# Patient Record
Sex: MALE | Race: WHITE | Employment: FULL TIME | URBAN - NONMETROPOLITAN AREA
[De-identification: names, ages, dates, MRNs, and addresses within clinical notes are randomized per-mention and may not be internally consistent; named-entity substitution may affect disease eponyms.]

---

## 2022-07-20 ENCOUNTER — HOSPITAL ENCOUNTER (EMERGENCY)
Age: 47
Discharge: HOME | End: 2022-07-21
Attending: EMERGENCY MEDICINE
Payer: COMMERCIAL

## 2022-07-20 ENCOUNTER — APPOINTMENT (OUTPATIENT)
Dept: CT IMAGING | Age: 47
End: 2022-07-20
Attending: EMERGENCY MEDICINE
Payer: COMMERCIAL

## 2022-07-20 DIAGNOSIS — K80.50 BILIARY COLIC: Primary | ICD-10-CM

## 2022-07-20 PROCEDURE — 74176 CT ABD & PELVIS W/O CONTRAST: CPT

## 2022-07-20 PROCEDURE — 84484 ASSAY OF TROPONIN QUANT: CPT

## 2022-07-20 PROCEDURE — 80076 HEPATIC FUNCTION PANEL: CPT

## 2022-07-20 PROCEDURE — 36415 COLL VENOUS BLD VENIPUNCTURE: CPT

## 2022-07-20 PROCEDURE — 80048 BASIC METABOLIC PNL TOTAL CA: CPT

## 2022-07-20 PROCEDURE — 96375 TX/PRO/DX INJ NEW DRUG ADDON: CPT

## 2022-07-20 PROCEDURE — 85025 COMPLETE CBC W/AUTO DIFF WBC: CPT

## 2022-07-20 PROCEDURE — 74011250636 HC RX REV CODE- 250/636: Performed by: EMERGENCY MEDICINE

## 2022-07-20 PROCEDURE — 96374 THER/PROPH/DIAG INJ IV PUSH: CPT

## 2022-07-20 PROCEDURE — 83690 ASSAY OF LIPASE: CPT

## 2022-07-20 PROCEDURE — 81003 URINALYSIS AUTO W/O SCOPE: CPT

## 2022-07-20 PROCEDURE — 99284 EMERGENCY DEPT VISIT MOD MDM: CPT

## 2022-07-20 RX ORDER — SODIUM CHLORIDE 9 MG/ML
1000 INJECTION, SOLUTION INTRAVENOUS ONCE
Status: COMPLETED | OUTPATIENT
Start: 2022-07-20 | End: 2022-07-21

## 2022-07-20 RX ORDER — KETOROLAC TROMETHAMINE 30 MG/ML
30 INJECTION, SOLUTION INTRAMUSCULAR; INTRAVENOUS
Status: COMPLETED | OUTPATIENT
Start: 2022-07-20 | End: 2022-07-20

## 2022-07-20 RX ORDER — ONDANSETRON 2 MG/ML
4 INJECTION INTRAMUSCULAR; INTRAVENOUS
Status: COMPLETED | OUTPATIENT
Start: 2022-07-20 | End: 2022-07-20

## 2022-07-20 RX ADMIN — SODIUM CHLORIDE 1000 ML: 9 INJECTION, SOLUTION INTRAVENOUS at 23:16

## 2022-07-20 RX ADMIN — ONDANSETRON 4 MG: 2 INJECTION INTRAMUSCULAR; INTRAVENOUS at 23:27

## 2022-07-20 RX ADMIN — KETOROLAC TROMETHAMINE 30 MG: 30 INJECTION, SOLUTION INTRAMUSCULAR at 23:17

## 2022-07-21 ENCOUNTER — HOSPITAL ENCOUNTER (EMERGENCY)
Age: 47
Discharge: ANOTHER HOSPITAL | End: 2022-07-22
Attending: EMERGENCY MEDICINE
Payer: COMMERCIAL

## 2022-07-21 VITALS
HEIGHT: 71 IN | TEMPERATURE: 97.6 F | HEART RATE: 84 BPM | DIASTOLIC BLOOD PRESSURE: 100 MMHG | BODY MASS INDEX: 32.06 KG/M2 | RESPIRATION RATE: 18 BRPM | OXYGEN SATURATION: 96 % | SYSTOLIC BLOOD PRESSURE: 152 MMHG | WEIGHT: 229 LBS

## 2022-07-21 DIAGNOSIS — K81.9 CHOLECYSTITIS: Primary | ICD-10-CM

## 2022-07-21 LAB
ALBUMIN SERPL-MCNC: 3.7 G/DL (ref 3.4–5)
ALBUMIN SERPL-MCNC: 3.8 G/DL (ref 3.4–5)
ALBUMIN/GLOB SERPL: 0.8 {RATIO} (ref 0.8–1.7)
ALBUMIN/GLOB SERPL: 1 {RATIO} (ref 0.8–1.7)
ALP SERPL-CCNC: 91 U/L (ref 45–117)
ALP SERPL-CCNC: 99 U/L (ref 45–117)
ALT SERPL-CCNC: 33 U/L (ref 16–61)
ALT SERPL-CCNC: 37 U/L (ref 16–61)
ANION GAP SERPL CALC-SCNC: 6 MMOL/L (ref 3–18)
ANION GAP SERPL CALC-SCNC: 8 MMOL/L (ref 3–18)
APPEARANCE UR: CLEAR
AST SERPL W P-5'-P-CCNC: 20 U/L (ref 10–38)
AST SERPL W P-5'-P-CCNC: 25 U/L (ref 10–38)
BACTERIA URNS QL MICRO: NEGATIVE /HPF
BASOPHILS # BLD: 0.1 K/UL (ref 0–0.1)
BASOPHILS # BLD: 0.1 K/UL (ref 0–0.1)
BASOPHILS NFR BLD: 1 % (ref 0–2)
BASOPHILS NFR BLD: 1 % (ref 0–2)
BILIRUB DIRECT SERPL-MCNC: 0.2 MG/DL (ref 0–0.2)
BILIRUB DIRECT SERPL-MCNC: 0.3 MG/DL (ref 0–0.2)
BILIRUB SERPL-MCNC: 1.3 MG/DL (ref 0.2–1)
BILIRUB SERPL-MCNC: 1.9 MG/DL (ref 0.2–1)
BILIRUB UR QL: NEGATIVE
BUN SERPL-MCNC: 11 MG/DL (ref 7–18)
BUN SERPL-MCNC: 12 MG/DL (ref 7–18)
BUN/CREAT SERPL: 10 (ref 12–20)
BUN/CREAT SERPL: 9 (ref 12–20)
CA-I BLD-MCNC: 9 MG/DL (ref 8.5–10.1)
CA-I BLD-MCNC: 9.3 MG/DL (ref 8.5–10.1)
CHLORIDE SERPL-SCNC: 101 MMOL/L (ref 100–111)
CHLORIDE SERPL-SCNC: 102 MMOL/L (ref 100–111)
CO2 SERPL-SCNC: 28 MMOL/L (ref 21–32)
CO2 SERPL-SCNC: 28 MMOL/L (ref 21–32)
COLOR UR: YELLOW
CREAT SERPL-MCNC: 1.17 MG/DL (ref 0.6–1.3)
CREAT SERPL-MCNC: 1.21 MG/DL (ref 0.6–1.3)
DIFFERENTIAL METHOD BLD: ABNORMAL
DIFFERENTIAL METHOD BLD: NORMAL
EOSINOPHIL # BLD: 0.1 K/UL (ref 0–0.4)
EOSINOPHIL # BLD: 0.2 K/UL (ref 0–0.4)
EOSINOPHIL NFR BLD: 1 % (ref 0–5)
EOSINOPHIL NFR BLD: 2 % (ref 0–5)
EPITH CASTS URNS QL MICRO: ABNORMAL /LPF (ref 0–20)
ERYTHROCYTE [DISTWIDTH] IN BLOOD BY AUTOMATED COUNT: 11.9 % (ref 11.6–14.5)
ERYTHROCYTE [DISTWIDTH] IN BLOOD BY AUTOMATED COUNT: 12 % (ref 11.6–14.5)
GLOBULIN SER CALC-MCNC: 3.8 G/DL (ref 2–4)
GLOBULIN SER CALC-MCNC: 4.5 G/DL (ref 2–4)
GLUCOSE SERPL-MCNC: 105 MG/DL (ref 74–99)
GLUCOSE SERPL-MCNC: 99 MG/DL (ref 74–99)
GLUCOSE UR STRIP.AUTO-MCNC: NEGATIVE MG/DL
HCT VFR BLD AUTO: 42.2 % (ref 36–48)
HCT VFR BLD AUTO: 43 % (ref 36–48)
HGB BLD-MCNC: 14.7 G/DL (ref 13–16)
HGB BLD-MCNC: 14.8 G/DL (ref 13–16)
HGB UR QL STRIP: NEGATIVE
IMM GRANULOCYTES # BLD AUTO: 0 K/UL (ref 0–0.04)
IMM GRANULOCYTES # BLD AUTO: 0.1 K/UL (ref 0–0.04)
IMM GRANULOCYTES NFR BLD AUTO: 0 % (ref 0–0.5)
IMM GRANULOCYTES NFR BLD AUTO: 0 % (ref 0–0.5)
KETONES UR QL STRIP.AUTO: NEGATIVE MG/DL
LEUKOCYTE ESTERASE UR QL STRIP.AUTO: NEGATIVE
LIPASE SERPL-CCNC: 108 U/L (ref 73–393)
LIPASE SERPL-CCNC: 120 U/L (ref 73–393)
LYMPHOCYTES # BLD: 1.9 K/UL (ref 0.9–3.6)
LYMPHOCYTES # BLD: 2.5 K/UL (ref 0.9–3.6)
LYMPHOCYTES NFR BLD: 15 % (ref 21–52)
LYMPHOCYTES NFR BLD: 22 % (ref 21–52)
MCH RBC QN AUTO: 31.3 PG (ref 24–34)
MCH RBC QN AUTO: 31.4 PG (ref 24–34)
MCHC RBC AUTO-ENTMCNC: 34.4 G/DL (ref 31–37)
MCHC RBC AUTO-ENTMCNC: 34.8 G/DL (ref 31–37)
MCV RBC AUTO: 90 FL (ref 78–100)
MCV RBC AUTO: 91.3 FL (ref 78–100)
MONOCYTES # BLD: 1 K/UL (ref 0.05–1.2)
MONOCYTES # BLD: 1.1 K/UL (ref 0.05–1.2)
MONOCYTES NFR BLD: 10 % (ref 3–10)
MONOCYTES NFR BLD: 8 % (ref 3–10)
NEUTS SEG # BLD: 7.5 K/UL (ref 1.8–8)
NEUTS SEG # BLD: 9.3 K/UL (ref 1.8–8)
NEUTS SEG NFR BLD: 65 % (ref 40–73)
NEUTS SEG NFR BLD: 75 % (ref 40–73)
NITRITE UR QL STRIP.AUTO: NEGATIVE
NRBC # BLD: 0 K/UL (ref 0–0.01)
NRBC # BLD: 0 K/UL (ref 0–0.01)
NRBC BLD-RTO: 0 PER 100 WBC
NRBC BLD-RTO: 0 PER 100 WBC
PH UR STRIP: 5.5 [PH] (ref 5–8)
PLATELET # BLD AUTO: 277 K/UL (ref 135–420)
PLATELET # BLD AUTO: 293 K/UL (ref 135–420)
PMV BLD AUTO: 9.1 FL (ref 9.2–11.8)
PMV BLD AUTO: 9.6 FL (ref 9.2–11.8)
POTASSIUM SERPL-SCNC: 3.7 MMOL/L (ref 3.5–5.5)
POTASSIUM SERPL-SCNC: 3.7 MMOL/L (ref 3.5–5.5)
PROT SERPL-MCNC: 7.5 G/DL (ref 6.4–8.2)
PROT SERPL-MCNC: 8.3 G/DL (ref 6.4–8.2)
PROT UR STRIP-MCNC: NEGATIVE MG/DL
RBC # BLD AUTO: 4.69 M/UL (ref 4.35–5.65)
RBC # BLD AUTO: 4.71 M/UL (ref 4.35–5.65)
RBC #/AREA URNS HPF: ABNORMAL /HPF (ref 0–2)
SODIUM SERPL-SCNC: 135 MMOL/L (ref 136–145)
SODIUM SERPL-SCNC: 138 MMOL/L (ref 136–145)
SP GR UR REFRACTOMETRY: 1.02 (ref 1–1.03)
TROPONIN-HIGH SENSITIVITY: 4 NG/L (ref 0–78)
TROPONIN-HIGH SENSITIVITY: 5 NG/L (ref 0–78)
UROBILINOGEN UR QL STRIP.AUTO: 0.2 EU/DL (ref 0.2–1)
WBC # BLD AUTO: 11.4 K/UL (ref 4.6–13.2)
WBC # BLD AUTO: 12.4 K/UL (ref 4.6–13.2)
WBC URNS QL MICRO: ABNORMAL /HPF (ref 0–4)

## 2022-07-21 PROCEDURE — 96374 THER/PROPH/DIAG INJ IV PUSH: CPT

## 2022-07-21 PROCEDURE — 96376 TX/PRO/DX INJ SAME DRUG ADON: CPT

## 2022-07-21 PROCEDURE — 74011250636 HC RX REV CODE- 250/636: Performed by: EMERGENCY MEDICINE

## 2022-07-21 PROCEDURE — 80076 HEPATIC FUNCTION PANEL: CPT

## 2022-07-21 PROCEDURE — 93005 ELECTROCARDIOGRAM TRACING: CPT

## 2022-07-21 PROCEDURE — 74011000258 HC RX REV CODE- 258: Performed by: EMERGENCY MEDICINE

## 2022-07-21 PROCEDURE — 99285 EMERGENCY DEPT VISIT HI MDM: CPT

## 2022-07-21 PROCEDURE — 96375 TX/PRO/DX INJ NEW DRUG ADDON: CPT

## 2022-07-21 PROCEDURE — 83690 ASSAY OF LIPASE: CPT

## 2022-07-21 PROCEDURE — 80048 BASIC METABOLIC PNL TOTAL CA: CPT

## 2022-07-21 PROCEDURE — 74011250637 HC RX REV CODE- 250/637: Performed by: EMERGENCY MEDICINE

## 2022-07-21 RX ORDER — ONDANSETRON 2 MG/ML
4 INJECTION INTRAMUSCULAR; INTRAVENOUS
Status: COMPLETED | OUTPATIENT
Start: 2022-07-21 | End: 2022-07-21

## 2022-07-21 RX ORDER — HYDROMORPHONE HYDROCHLORIDE 1 MG/ML
0.5 INJECTION, SOLUTION INTRAMUSCULAR; INTRAVENOUS; SUBCUTANEOUS ONCE
Status: COMPLETED | OUTPATIENT
Start: 2022-07-21 | End: 2022-07-21

## 2022-07-21 RX ORDER — ONDANSETRON 4 MG/1
4 TABLET, ORALLY DISINTEGRATING ORAL
Qty: 14 TABLET | Refills: 0 | Status: SHIPPED | OUTPATIENT
Start: 2022-07-21

## 2022-07-21 RX ORDER — SODIUM CHLORIDE 9 MG/ML
1000 INJECTION, SOLUTION INTRAVENOUS ONCE
Status: COMPLETED | OUTPATIENT
Start: 2022-07-21 | End: 2022-07-21

## 2022-07-21 RX ORDER — AMOXICILLIN 250 MG/1
500 CAPSULE ORAL
Status: COMPLETED | OUTPATIENT
Start: 2022-07-21 | End: 2022-07-21

## 2022-07-21 RX ORDER — AMOXICILLIN 500 MG/1
500 TABLET, FILM COATED ORAL 3 TIMES DAILY
Qty: 21 TABLET | Refills: 0 | Status: SHIPPED | OUTPATIENT
Start: 2022-07-21 | End: 2022-07-28

## 2022-07-21 RX ORDER — OXYCODONE AND ACETAMINOPHEN 5; 325 MG/1; MG/1
1 TABLET ORAL
Qty: 14 TABLET | Refills: 0 | Status: SHIPPED | OUTPATIENT
Start: 2022-07-21 | End: 2022-07-28

## 2022-07-21 RX ADMIN — HYDROMORPHONE HYDROCHLORIDE 0.5 MG: 1 INJECTION, SOLUTION INTRAMUSCULAR; INTRAVENOUS; SUBCUTANEOUS at 20:57

## 2022-07-21 RX ADMIN — HYDROMORPHONE HYDROCHLORIDE 0.5 MG: 1 INJECTION, SOLUTION INTRAMUSCULAR; INTRAVENOUS; SUBCUTANEOUS at 23:55

## 2022-07-21 RX ADMIN — AMOXICILLIN 500 MG: 250 CAPSULE ORAL at 01:32

## 2022-07-21 RX ADMIN — HYDROMORPHONE HYDROCHLORIDE 0.5 MG: 1 INJECTION, SOLUTION INTRAMUSCULAR; INTRAVENOUS; SUBCUTANEOUS at 22:16

## 2022-07-21 RX ADMIN — ONDANSETRON 4 MG: 2 INJECTION INTRAMUSCULAR; INTRAVENOUS at 23:55

## 2022-07-21 RX ADMIN — CEFTRIAXONE 1 G: 1 INJECTION, POWDER, FOR SOLUTION INTRAMUSCULAR; INTRAVENOUS at 22:16

## 2022-07-21 RX ADMIN — SODIUM CHLORIDE 1000 ML: 9 INJECTION, SOLUTION INTRAVENOUS at 20:57

## 2022-07-21 RX ADMIN — ONDANSETRON 4 MG: 2 INJECTION INTRAMUSCULAR; INTRAVENOUS at 20:57

## 2022-07-21 RX ADMIN — HYDROMORPHONE HYDROCHLORIDE 0.5 MG: 1 INJECTION, SOLUTION INTRAMUSCULAR; INTRAVENOUS; SUBCUTANEOUS at 00:10

## 2022-07-21 NOTE — DISCHARGE INSTRUCTIONS
Return for pain, fever not resolving with motrin or tylenol, shortness of breath, vomiting, decreased fluid intake, weakness, numbness, dizziness, or any change or concerns or if you change your mind regarding admission here now.

## 2022-07-21 NOTE — ED NOTES
Pt states pain much improved s/p medication, resting in POC without distress, respiration regular and unlabored.

## 2022-07-21 NOTE — ED TRIAGE NOTES
Pt ambulatory from [address] [facility] with steady gait, states that aprox 3 hours after drinking coffee \"from a questionable coffee cup\" he began having RUQ pain. Did a colon flush at home with some relief yesterday, took tylenol PM at home aprox 6 hours with no pain relief.  LBM today described as normal.

## 2022-07-21 NOTE — ED PROVIDER NOTES
Pt c/o rt upper abd pain x 2 days, moderate, wax/waning. Sharp. Nothing makes herminio/worse. No def stool changes but tried colon cleanse w no change. Also tyl pm taken w no change. Mild rt mid back pain also. No fever. No cough. no cp. No sob. No prior abd octavia       History reviewed. No pertinent past medical history. History reviewed. No pertinent surgical history. History reviewed. No pertinent family history. Social History     Socioeconomic History    Marital status: UNKNOWN     Spouse name: Not on file    Number of children: Not on file    Years of education: Not on file    Highest education level: Not on file   Occupational History    Not on file   Tobacco Use    Smoking status: Never    Smokeless tobacco: Never   Substance and Sexual Activity    Alcohol use: Never    Drug use: Never    Sexual activity: Not on file   Other Topics Concern    Not on file   Social History Narrative    Not on file     Social Determinants of Health     Financial Resource Strain: Not on file   Food Insecurity: Not on file   Transportation Needs: Not on file   Physical Activity: Not on file   Stress: Not on file   Social Connections: Not on file   Intimate Partner Violence: Not on file   Housing Stability: Not on file         ALLERGIES: Patient has no known allergies. Review of Systems   Constitutional:  Negative for diaphoresis and fever. HENT:  Negative for congestion. Respiratory:  Negative for cough and shortness of breath. Cardiovascular:  Negative for chest pain. Gastrointestinal:  Positive for abdominal pain and nausea. Musculoskeletal:  Positive for back pain. Skin:  Negative for rash. Neurological:  Negative for dizziness. All other systems reviewed and are negative.     Vitals:    07/20/22 2246 07/20/22 2355   BP: (!) 168/104 (!) 154/102   Pulse: 97 78   Resp: 18 18   Temp: 97.6 °F (36.4 °C)    SpO2: 97% 96%   Weight: 103.9 kg (229 lb)    Height: 5' 11\" (1.803 m)             Physical Exam  Vitals and nursing note reviewed. Constitutional:       Appearance: He is well-developed. HENT:      Head: Normocephalic and atraumatic. Eyes:      Conjunctiva/sclera: Conjunctivae normal.   Cardiovascular:      Rate and Rhythm: Normal rate and regular rhythm. Pulmonary:      Effort: Pulmonary effort is normal.      Breath sounds: No wheezing. Abdominal:      Palpations: Abdomen is soft. Tenderness: There is abdominal tenderness in the right upper quadrant. There is no guarding. Musculoskeletal:         General: No tenderness. Cervical back: Normal range of motion. Skin:     General: Skin is warm and dry. Capillary Refill: Capillary refill takes less than 2 seconds. Findings: No rash. Neurological:      Mental Status: He is alert and oriented to person, place, and time.         MDM         Procedures      Vitals:  Patient Vitals for the past 12 hrs:   Temp Pulse Resp BP SpO2   07/20/22 2355 -- 78 18 (!) 154/102 96 %   07/20/22 2246 97.6 °F (36.4 °C) 97 18 (!) 168/104 97 %         Medications ordered:   Medications   amoxicillin (AMOXIL) capsule 500 mg (has no administration in time range)   0.9% sodium chloride infusion 1,000 mL (1,000 mL IntraVENous New Bag 7/20/22 2316)   ondansetron (ZOFRAN) injection 4 mg (4 mg IntraVENous Given 7/20/22 2327)   ketorolac (TORADOL) injection 30 mg (30 mg IntraVENous Given 7/20/22 2317)   HYDROmorphone (DILAUDID) injection 0.5 mg (0.5 mg IntraVENous Given 7/21/22 0010)         Lab findings:  Recent Results (from the past 12 hour(s))   METABOLIC PANEL, BASIC    Collection Time: 07/20/22 11:18 PM   Result Value Ref Range    Sodium 138 136 - 145 mmol/L    Potassium 3.7 3.5 - 5.5 mmol/L    Chloride 102 100 - 111 mmol/L    CO2 28 21 - 32 mmol/L    Anion gap 8 3.0 - 18.0 mmol/L    Glucose 105 (H) 74 - 99 mg/dL    BUN 12 7 - 18 mg/dL    Creatinine 1.17 0.60 - 1.30 mg/dL    BUN/Creatinine ratio 10 (L) 12 - 20      GFR est AA >60 >60 ml/min/1.73m2 GFR est non-AA >60 >60 ml/min/1.73m2    Calcium 9.0 8.5 - 10.1 mg/dL   CBC WITH AUTOMATED DIFF    Collection Time: 07/20/22 11:18 PM   Result Value Ref Range    WBC 11.4 4.6 - 13.2 K/uL    RBC 4.71 4.35 - 5.65 M/uL    HGB 14.8 13.0 - 16.0 g/dL    HCT 43.0 36.0 - 48.0 %    MCV 91.3 78.0 - 100.0 FL    MCH 31.4 24.0 - 34.0 PG    MCHC 34.4 31.0 - 37.0 g/dL    RDW 11.9 11.6 - 14.5 %    PLATELET 147 326 - 043 K/uL    MPV 9.6 9.2 - 11.8 FL    NRBC 0.0 0.0  WBC    ABSOLUTE NRBC 0.00 0.00 - 0.01 K/uL    NEUTROPHILS 65 40 - 73 %    LYMPHOCYTES 22 21 - 52 %    MONOCYTES 10 3 - 10 %    EOSINOPHILS 2 0 - 5 %    BASOPHILS 1 0 - 2 %    IMMATURE GRANULOCYTES 0 0 - 0.5 %    ABS. NEUTROPHILS 7.5 1.8 - 8.0 K/UL    ABS. LYMPHOCYTES 2.5 0.9 - 3.6 K/UL    ABS. MONOCYTES 1.1 0.05 - 1.2 K/UL    ABS. EOSINOPHILS 0.2 0.0 - 0.4 K/UL    ABS. BASOPHILS 0.1 0.0 - 0.1 K/UL    ABS. IMM. GRANS. 0.0 0.00 - 0.04 K/UL    DF AUTOMATED     HEPATIC FUNCTION PANEL    Collection Time: 07/20/22 11:18 PM   Result Value Ref Range    Protein, total 7.5 6.4 - 8.2 g/dL    Albumin 3.7 3.4 - 5.0 g/dL    Globulin 3.8 2.0 - 4.0 g/dL    A-G Ratio 1.0 0.8 - 1.7      Bilirubin, total 1.3 (H) 0.2 - 1.0 mg/dL    Bilirubin, direct 0.2 0.0 - 0.2 mg/dL    Alk.  phosphatase 99 45 - 117 U/L    AST (SGOT) 25 10 - 38 U/L    ALT (SGPT) 37 16 - 61 U/L   LIPASE    Collection Time: 07/20/22 11:18 PM   Result Value Ref Range    Lipase 120 73 - 393 U/L   URINALYSIS W/ RFLX MICROSCOPIC    Collection Time: 07/20/22 11:18 PM   Result Value Ref Range    Color Yellow      Appearance Clear      Specific gravity 1.021 1.005 - 1.030      pH (UA) 5.5 5.0 - 8.0      Protein Negative Negative mg/dL    Glucose Negative Negative mg/dL    Ketone Negative Negative mg/dL    Bilirubin Negative Negative      Blood Negative Negative      Urobilinogen 0.2 0.2 - 1.0 EU/dL    Nitrites Negative Negative      Leukocyte Esterase Negative Negative      WBC 0-4 0 - 4 /hpf    RBC 0-5 0 - 2 /hpf Epithelial cells Few 0 - 20 /lpf    Bacteria Negative (A) None /hpf   TROPONIN-HIGH SENSITIVITY    Collection Time: 07/20/22 11:18 PM   Result Value Ref Range    Troponin-High Sensitivity 4 0 - 78 ng/L           X-Ray, CT or other radiology findings or impressions:  CT ABD PELV WO CONT   Final Result      Gallstones with gallbladder wall thickening/pericholecystic fluid. The   gallbladder disease. No other significant abnormality seen. Progress notes, Consult notes or additional Procedure notes:   1:26 AM pt told of all findings inc s/o infection of gb on ct. Cholecystitis, and sig of that. Pt says doesn't want to be admitted, is pain free, abd soft and non-tender, and adamantly wants dc now. Af, nl wbc, told of sig ct findings but to dc per pt. Detailed ret inst given. Urged to ret for any changes or admit as d/w pt now      Diagnosis:   1. Biliary colic        Disposition: hoome    Follow-up Information       Follow up With Specialties Details Why Contact [facility] EMERGENCY DEPT Emergency Medicine Go to  As needed, If symptoms worsen [facility] [address] [facility]    Madhav Lui MD Surgery Physician, Vascular Surgery, Cardiothoracic Surgery Schedule an appointment as soon as possible for a visit in 3 days  [address] [facility] [address]      Simona Rosas MD Family Medicine Schedule an appointment as soon as possible for a visit in 2 days  [address]  [phone]               Patient's Medications   Start Taking    AMOXICILLIN 500 MG TAB    Take 500 mg by mouth three (3) times daily for 7 days. ONDANSETRON (ZOFRAN ODT) 4 MG DISINTEGRATING TABLET    Take 1 Tablet by mouth every eight (8) hours as needed for Nausea or Vomiting. OXYCODONE-ACETAMINOPHEN (PERCOCET) 5-325 MG PER TABLET    Take 1 Tablet by mouth every six (6) hours as needed for Pain for up to 7 days. Max Daily Amount: 4 Tablets. Continue Taking    No medications on file   These Medications have changed    No medications on file   Stop Taking    No medications on file

## 2022-07-21 NOTE — ED NOTES
I have reviewed discharge instructions with the patient. The patient verbalized understanding. Pt verbalized understanding not to drive while taking oxycodone, wife providing transportation home this morning.

## 2022-07-22 ENCOUNTER — HOSPITAL ENCOUNTER (INPATIENT)
Age: 47
LOS: 3 days | Discharge: HOME | DRG: 414 | End: 2022-07-28
Attending: STUDENT IN AN ORGANIZED HEALTH CARE EDUCATION/TRAINING PROGRAM | Admitting: SURGERY
Payer: COMMERCIAL

## 2022-07-22 VITALS
OXYGEN SATURATION: 97 % | BODY MASS INDEX: 32.06 KG/M2 | WEIGHT: 229 LBS | HEART RATE: 78 BPM | TEMPERATURE: 98.5 F | SYSTOLIC BLOOD PRESSURE: 163 MMHG | DIASTOLIC BLOOD PRESSURE: 93 MMHG | RESPIRATION RATE: 18 BRPM | HEIGHT: 71 IN

## 2022-07-22 DIAGNOSIS — K81.9 CHOLECYSTITIS: Primary | ICD-10-CM

## 2022-07-22 PROBLEM — K81.0 ACUTE CHOLECYSTITIS: Status: ACTIVE | Noted: 2022-07-22

## 2022-07-22 LAB
ATRIAL RATE: 86 BPM
CALCULATED P AXIS, ECG09: 55 DEGREES
CALCULATED R AXIS, ECG10: 60 DEGREES
CALCULATED T AXIS, ECG11: -15 DEGREES
DIAGNOSIS, 93000: NORMAL
P-R INTERVAL, ECG05: 148 MS
Q-T INTERVAL, ECG07: 349 MS
QRS DURATION, ECG06: 84 MS
QTC CALCULATION (BEZET), ECG08: 418 MS
VENTRICULAR RATE, ECG03: 86 BPM

## 2022-07-22 PROCEDURE — 74011000258 HC RX REV CODE- 258: Performed by: SURGERY

## 2022-07-22 PROCEDURE — 96376 TX/PRO/DX INJ SAME DRUG ADON: CPT

## 2022-07-22 PROCEDURE — 99285 EMERGENCY DEPT VISIT HI MDM: CPT

## 2022-07-22 PROCEDURE — 74011250636 HC RX REV CODE- 250/636: Performed by: SURGERY

## 2022-07-22 PROCEDURE — G0378 HOSPITAL OBSERVATION PER HR: HCPCS

## 2022-07-22 PROCEDURE — 74011000250 HC RX REV CODE- 250: Performed by: SURGERY

## 2022-07-22 PROCEDURE — 96375 TX/PRO/DX INJ NEW DRUG ADDON: CPT

## 2022-07-22 PROCEDURE — 74011250636 HC RX REV CODE- 250/636: Performed by: PHYSICIAN ASSISTANT

## 2022-07-22 PROCEDURE — 96374 THER/PROPH/DIAG INJ IV PUSH: CPT

## 2022-07-22 PROCEDURE — 99219 PR INITIAL OBSERVATION CARE/DAY 50 MINUTES: CPT | Performed by: SURGERY

## 2022-07-22 RX ORDER — SODIUM CHLORIDE 0.9 % (FLUSH) 0.9 %
5-40 SYRINGE (ML) INJECTION AS NEEDED
Status: DISCONTINUED | OUTPATIENT
Start: 2022-07-22 | End: 2022-07-28 | Stop reason: HOSPADM

## 2022-07-22 RX ORDER — PROCHLORPERAZINE EDISYLATE 5 MG/ML
10 INJECTION INTRAMUSCULAR; INTRAVENOUS
Status: COMPLETED | OUTPATIENT
Start: 2022-07-22 | End: 2022-07-22

## 2022-07-22 RX ORDER — HYDROMORPHONE HYDROCHLORIDE 1 MG/ML
0.5 INJECTION, SOLUTION INTRAMUSCULAR; INTRAVENOUS; SUBCUTANEOUS ONCE
Status: COMPLETED | OUTPATIENT
Start: 2022-07-22 | End: 2022-07-22

## 2022-07-22 RX ORDER — ONDANSETRON 2 MG/ML
4 INJECTION INTRAMUSCULAR; INTRAVENOUS
Status: DISCONTINUED | OUTPATIENT
Start: 2022-07-22 | End: 2022-07-28 | Stop reason: HOSPADM

## 2022-07-22 RX ORDER — POLYETHYLENE GLYCOL 3350 17 G/17G
17 POWDER, FOR SOLUTION ORAL DAILY PRN
Status: DISCONTINUED | OUTPATIENT
Start: 2022-07-22 | End: 2022-07-28 | Stop reason: HOSPADM

## 2022-07-22 RX ORDER — ENOXAPARIN SODIUM 100 MG/ML
40 INJECTION SUBCUTANEOUS DAILY
Status: DISCONTINUED | OUTPATIENT
Start: 2022-07-22 | End: 2022-07-24

## 2022-07-22 RX ORDER — SODIUM CHLORIDE 9 MG/ML
75 INJECTION, SOLUTION INTRAVENOUS CONTINUOUS
Status: DISCONTINUED | OUTPATIENT
Start: 2022-07-22 | End: 2022-07-25

## 2022-07-22 RX ORDER — ONDANSETRON 4 MG/1
4 TABLET, ORALLY DISINTEGRATING ORAL
Status: DISCONTINUED | OUTPATIENT
Start: 2022-07-22 | End: 2022-07-28 | Stop reason: HOSPADM

## 2022-07-22 RX ORDER — ACETAMINOPHEN 325 MG/1
650 TABLET ORAL
Status: DISCONTINUED | OUTPATIENT
Start: 2022-07-22 | End: 2022-07-28 | Stop reason: HOSPADM

## 2022-07-22 RX ORDER — ACETAMINOPHEN 650 MG/1
650 SUPPOSITORY RECTAL
Status: DISCONTINUED | OUTPATIENT
Start: 2022-07-22 | End: 2022-07-28 | Stop reason: HOSPADM

## 2022-07-22 RX ORDER — ONDANSETRON 2 MG/ML
4 INJECTION INTRAMUSCULAR; INTRAVENOUS
Status: DISCONTINUED | OUTPATIENT
Start: 2022-07-22 | End: 2022-07-22

## 2022-07-22 RX ORDER — SODIUM CHLORIDE 0.9 % (FLUSH) 0.9 %
5-40 SYRINGE (ML) INJECTION EVERY 8 HOURS
Status: DISCONTINUED | OUTPATIENT
Start: 2022-07-22 | End: 2022-07-28 | Stop reason: HOSPADM

## 2022-07-22 RX ORDER — HYDROMORPHONE HYDROCHLORIDE 1 MG/ML
1 INJECTION, SOLUTION INTRAMUSCULAR; INTRAVENOUS; SUBCUTANEOUS
Status: DISPENSED | OUTPATIENT
Start: 2022-07-22 | End: 2022-07-24

## 2022-07-22 RX ADMIN — PROCHLORPERAZINE EDISYLATE 10 MG: 5 INJECTION INTRAMUSCULAR; INTRAVENOUS at 03:27

## 2022-07-22 RX ADMIN — HYDROMORPHONE HYDROCHLORIDE 1 MG: 1 INJECTION, SOLUTION INTRAMUSCULAR; INTRAVENOUS; SUBCUTANEOUS at 07:37

## 2022-07-22 RX ADMIN — HYDROMORPHONE HYDROCHLORIDE 1 MG: 1 INJECTION, SOLUTION INTRAMUSCULAR; INTRAVENOUS; SUBCUTANEOUS at 11:45

## 2022-07-22 RX ADMIN — SODIUM CHLORIDE 75 ML/HR: 9 INJECTION, SOLUTION INTRAVENOUS at 05:21

## 2022-07-22 RX ADMIN — SODIUM CHLORIDE, PRESERVATIVE FREE 10 ML: 5 INJECTION INTRAVENOUS at 07:37

## 2022-07-22 RX ADMIN — SODIUM CHLORIDE 75 ML/HR: 9 INJECTION, SOLUTION INTRAVENOUS at 20:11

## 2022-07-22 RX ADMIN — PIPERACILLIN SODIUM AND TAZOBACTAM SODIUM 4.5 G: 4; .5 INJECTION, POWDER, LYOPHILIZED, FOR SOLUTION INTRAVENOUS at 05:36

## 2022-07-22 RX ADMIN — SODIUM CHLORIDE, PRESERVATIVE FREE 10 ML: 5 INJECTION INTRAVENOUS at 15:31

## 2022-07-22 RX ADMIN — HYDROMORPHONE HYDROCHLORIDE 1 MG: 1 INJECTION, SOLUTION INTRAMUSCULAR; INTRAVENOUS; SUBCUTANEOUS at 17:19

## 2022-07-22 RX ADMIN — PIPERACILLIN AND TAZOBACTAM 3.38 G: 3; .375 INJECTION, POWDER, LYOPHILIZED, FOR SOLUTION INTRAVENOUS at 20:11

## 2022-07-22 RX ADMIN — PIPERACILLIN AND TAZOBACTAM 3.38 G: 3; .375 INJECTION, POWDER, LYOPHILIZED, FOR SOLUTION INTRAVENOUS at 11:44

## 2022-07-22 RX ADMIN — HYDROMORPHONE HYDROCHLORIDE 0.5 MG: 1 INJECTION, SOLUTION INTRAMUSCULAR; INTRAVENOUS; SUBCUTANEOUS at 03:27

## 2022-07-22 NOTE — PROGRESS NOTES
SON)/ [place] Outpatient Observation Notice (Melquiades Ramirez) provided to patient/representative with verbal explanation of the notice. Time allotted for questions regarding the notice. Patient /representative provided a completed copy of the SON/VOON notice. Copy placed on bedside chart.

## 2022-07-22 NOTE — H&P
History and Physical    Chief complaints: Abdominal pain. History of Presenting Illness:  Marco A Buckner is a 52 y.o. very pleasant man presents hospital abdominal pain for 2 days. Pain is localized epigastric area sometimes radiating to the back. Pain is localized sharp no other relieving factor. Patient was nauseous. Denies any fever but does have some chills. Patient has no prior abdominal surgery. CT scan abdomen pelvis shows acute cholecystitis. History reviewed. No pertinent past medical history. No past surgical history on file. History reviewed. No pertinent family history. Social History     Tobacco Use    Smoking status: Never    Smokeless tobacco: Never   Substance Use Topics    Alcohol use: Never       Prior to Admission medications    Medication Sig Start Date End Date Taking? Authorizing Provider   amoxicillin 500 mg tab Take 500 mg by mouth three (3) times daily for 7 days. 7/21/22 7/28/22 Yes Rae Pinto MD   oxyCODONE-acetaminophen (Percocet) 5-325 mg per tablet Take 1 Tablet by mouth every six (6) hours as needed for Pain for up to 7 days. Max Daily Amount: 4 Tablets. 7/21/22 7/28/22 Yes Rae Pinto MD   ondansetron (ZOFRAN ODT) 4 mg disintegrating tablet Take 1 Tablet by mouth every eight (8) hours as needed for Nausea or Vomiting. 7/21/22  Yes Rae Pinto MD     No Known Allergies     Review of Systems:  Pertinent review of systems discussed in HPI, and rest of organ systems personally reviewed and they are negative. Objective:   Vital signs reviewed:      Visit Vitals  BP (!) 150/86   Pulse 83   Temp (!) 96.6 °F (35.9 °C)   Resp 20   Ht 5' 11\" (1.803 m)   Wt 229 lb (103.9 kg)   SpO2 94%   BMI 31.94 kg/m²       Physical Exam:   General appearance:   Patient is awake and alert, not in particular distress. Head and neck atraumatic normocephalic. ENT shows normal oral mucosa, no jaundice no hoarse voice.   Eyes: Pupil equal gaze appropriate. Cardiac system regular rate rhythm. Pulmonary: No audible wheeze. Chest wall: Chest wall excursion normal with respiration cycle, there is no deformity or chest trauma. Abdomen: Tender epigastric area. Neurologic: Nonfocal.  Cranial nerves intact, no new focal findings. Musculoskeletal system: Motor function normal limits, motor function 5 out of 5, range of motion normal in all 4 extremity  Skin: Warm and moist.  Hematologic system: No obvious bruising. Psychosocial: Appropriate and cooperative. Vascular examination: Lower and upper extremities warm to touch, no signs of ischemia or cyanosis. Data Review: Labs are reviewed. Discussed  Recent Results (from the past 24 hour(s))   CBC WITH AUTOMATED DIFF    Collection Time: 07/21/22  8:23 PM   Result Value Ref Range    WBC 12.4 4.6 - 13.2 K/uL    RBC 4.69 4.35 - 5.65 M/uL    HGB 14.7 13.0 - 16.0 g/dL    HCT 42.2 36.0 - 48.0 %    MCV 90.0 78.0 - 100.0 FL    MCH 31.3 24.0 - 34.0 PG    MCHC 34.8 31.0 - 37.0 g/dL    RDW 12.0 11.6 - 14.5 %    PLATELET 637 913 - 389 K/uL    MPV 9.1 (L) 9.2 - 11.8 FL    NRBC 0.0 0.0  WBC    ABSOLUTE NRBC 0.00 0.00 - 0.01 K/uL    NEUTROPHILS 75 (H) 40 - 73 %    LYMPHOCYTES 15 (L) 21 - 52 %    MONOCYTES 8 3 - 10 %    EOSINOPHILS 1 0 - 5 %    BASOPHILS 1 0 - 2 %    IMMATURE GRANULOCYTES 0 0 - 0.5 %    ABS. NEUTROPHILS 9.3 (H) 1.8 - 8.0 K/UL    ABS. LYMPHOCYTES 1.9 0.9 - 3.6 K/UL    ABS. MONOCYTES 1.0 0.05 - 1.2 K/UL    ABS. EOSINOPHILS 0.1 0.0 - 0.4 K/UL    ABS. BASOPHILS 0.1 0.0 - 0.1 K/UL    ABS. IMM.  GRANS. 0.1 (H) 0.00 - 0.04 K/UL    DF AUTOMATED     METABOLIC PANEL, BASIC    Collection Time: 07/21/22  8:23 PM   Result Value Ref Range    Sodium 135 (L) 136 - 145 mmol/L    Potassium 3.7 3.5 - 5.5 mmol/L    Chloride 101 100 - 111 mmol/L    CO2 28 21 - 32 mmol/L    Anion gap 6 3.0 - 18.0 mmol/L    Glucose 99 74 - 99 mg/dL    BUN 11 7 - 18 mg/dL    Creatinine 1.21 0.60 - 1.30 mg/dL    BUN/Creatinine ratio 9 (L) 12 - 20      GFR est AA >60 >60 ml/min/1.73m2    GFR est non-AA >60 >60 ml/min/1.73m2    Calcium 9.3 8.5 - 10.1 mg/dL   LIPASE    Collection Time: 07/21/22  8:23 PM   Result Value Ref Range    Lipase 108 73 - 393 U/L   HEPATIC FUNCTION PANEL    Collection Time: 07/21/22  8:23 PM   Result Value Ref Range    Protein, total 8.3 (H) 6.4 - 8.2 g/dL    Albumin 3.8 3.4 - 5.0 g/dL    Globulin 4.5 (H) 2.0 - 4.0 g/dL    A-G Ratio 0.8 0.8 - 1.7      Bilirubin, total 1.9 (H) 0.2 - 1.0 mg/dL    Bilirubin, direct 0.3 (H) 0.0 - 0.2 mg/dL    Alk. phosphatase 91 45 - 117 U/L    AST (SGOT) 20 10 - 38 U/L    ALT (SGPT) 33 16 - 61 U/L   TROPONIN-HIGH SENSITIVITY    Collection Time: 07/21/22  8:23 PM   Result Value Ref Range    Troponin-High Sensitivity 5 0 - 78 ng/L   EKG, 12 LEAD, INITIAL    Collection Time: 07/21/22  9:07 PM   Result Value Ref Range    Ventricular Rate 86 BPM    Atrial Rate 86 BPM    P-R Interval 148 ms    QRS Duration 84 ms    Q-T Interval 349 ms    QTC Calculation (Bezet) 418 ms    Calculated P Axis 55 degrees    Calculated R Axis 60 degrees    Calculated T Axis -15 degrees    Diagnosis       Sinus rhythm  Probable left atrial enlargement  Borderline T abnormalities, inferior leads               Imagings reviewed: discussed as below. No name on file. Assessment:     Active Problems:    Cholecystitis (7/22/2022)      Acute cholecystitis (7/22/2022)        Plan:     I discussed with patient CT scan findings, discussed current working diagnosis with acute cholecystitis. Patient's liver enzymes normal there is no signs of choledocholithiasis. We discussed definitive surgical care including laparoscopic cholecystectomy. I am out of town today and is signing out this patient to on-call surgeon. I did inform the patient about this as well. Continue n.p.o. status, continue IV Zosyn and adequate pain control.

## 2022-07-22 NOTE — ED PROVIDER NOTES
EMERGENCY DEPARTMENT HISTORY AND PHYSICAL EXAM      Date: 7/22/2022  Patient Name: Abhinav Mejia    History of Presenting Illness     Chief Complaint   Patient presents with    Abdominal Pain    Transfer Of Care       History Provided By: Patient    HPI: Abhinav Mejia, 52 y.o. male with a significant past medical history of HTN who presents as a transfer from [facility] to this ED for surgical consult and admission secondary to cholecystitis. Patient reports 3 to 4-day history of gradually worsening right upper quadrant pain with radiation through to his back. He additionally endorses nausea and vomiting this evening. Patient had CT imaging at outside hospital which demonstrated Gallstones with gallbladder wall thickening/pericholecystic fluid with concern for CBD stone due to increased total bilirubin. Patient presents with no new concerns or complaints. There are no other complaints, changes, or physical findings at this time.     PCP: None    Current Facility-Administered Medications on File Prior to Encounter   Medication Dose Route Frequency Provider Last Rate Last Admin    [COMPLETED] HYDROmorphone (DILAUDID) injection 0.5 mg  0.5 mg IntraVENous ONCE Juan Jose MENJIVAR MD   0.5 mg at 07/21/22 2057    [COMPLETED] ondansetron (ZOFRAN) injection 4 mg  4 mg IntraVENous NOW Karissa Jack MD   4 mg at 07/21/22 2057    [COMPLETED] 0.9% sodium chloride infusion 1,000 mL  1,000 mL IntraVENous ONCE Juan Jose MENJIVAR MD 1,000 mL/hr at 07/21/22 2057 1,000 mL at 07/21/22 2057    [COMPLETED] cefTRIAXone (ROCEPHIN) 1 g in 0.9% sodium chloride (MBP/ADV) 50 mL MBP  1 g IntraVENous NOW Karissa Jack  mL/hr at 07/21/22 2216 1 g at 07/21/22 2216    [COMPLETED] HYDROmorphone (DILAUDID) injection 0.5 mg  0.5 mg IntraVENous ONCE Karissa Jack MD   0.5 mg at 07/21/22 2216    [COMPLETED] HYDROmorphone (DILAUDID) injection 0.5 mg  0.5 mg IntraVENous ONCE Karissa Jack MD   0.5 mg at 07/21/22 7506    [COMPLETED] ondansetron (ZOFRAN) injection 4 mg  4 mg IntraVENous NOW Eddy Amezquita MD   4 mg at 07/21/22 4871     Current Outpatient Medications on File Prior to Encounter   Medication Sig Dispense Refill    amoxicillin 500 mg tab Take 500 mg by mouth three (3) times daily for 7 days. 21 Tablet 0    oxyCODONE-acetaminophen (Percocet) 5-325 mg per tablet Take 1 Tablet by mouth every six (6) hours as needed for Pain for up to 7 days. Max Daily Amount: 4 Tablets. 14 Tablet 0    ondansetron (ZOFRAN ODT) 4 mg disintegrating tablet Take 1 Tablet by mouth every eight (8) hours as needed for Nausea or Vomiting. 14 Tablet 0       Past History     Past Medical History:  History reviewed. No pertinent past medical history. Past Surgical History:  No past surgical history on file. Family History:  History reviewed. No pertinent family history. Social History:  Social History     Tobacco Use    Smoking status: Never    Smokeless tobacco: Never   Substance Use Topics    Alcohol use: Never    Drug use: Never       Allergies:  No Known Allergies    Review of Systems   Review of Systems   Constitutional: Negative. Negative for chills, diaphoresis and fever. HENT: Negative. Negative for congestion, rhinorrhea and sore throat. Eyes: Negative. Negative for pain. Respiratory: Negative. Negative for cough, chest tightness, shortness of breath and wheezing. Cardiovascular: Negative. Negative for chest pain and palpitations. Gastrointestinal:  Positive for abdominal pain, nausea and vomiting. Negative for diarrhea. Genitourinary: Negative. Negative for difficulty urinating, dysuria, frequency and hematuria. Musculoskeletal:  Positive for back pain. Skin: Negative. Negative for rash. Neurological: Negative. Negative for dizziness, weakness, light-headedness, numbness and headaches. Psychiatric/Behavioral: Negative.      All other systems reviewed and are negative. Physical Exam   Physical Exam  Vitals and nursing note reviewed. Constitutional:       General: He is not in acute distress. Appearance: Normal appearance. He is obese. He is not ill-appearing or toxic-appearing. Comments: Appears uncomfortable   HENT:      Head: Normocephalic and atraumatic. Mouth/Throat:      Mouth: Mucous membranes are moist.   Eyes:      Extraocular Movements: Extraocular movements intact. Conjunctiva/sclera: Conjunctivae normal.      Pupils: Pupils are equal, round, and reactive to light. Cardiovascular:      Rate and Rhythm: Normal rate and regular rhythm. Pulses: Normal pulses. Heart sounds: Normal heart sounds. No murmur heard. No friction rub. No gallop. Pulmonary:      Effort: Pulmonary effort is normal. No respiratory distress. Breath sounds: Normal breath sounds. No wheezing, rhonchi or rales. Abdominal:      General: Bowel sounds are normal. There is no distension. Palpations: Abdomen is soft. Tenderness: There is abdominal tenderness in the right upper quadrant. There is no guarding or rebound. Musculoskeletal:         General: No tenderness. Cervical back: Neck supple. No tenderness. Right lower leg: No edema. Left lower leg: No edema. Skin:     General: Skin is warm and dry. Findings: No rash. Neurological:      General: No focal deficit present. Mental Status: He is alert and oriented to person, place, and time.        Lab and Diagnostic Study Results   Labs -     Recent Results (from the past 12 hour(s))   CBC WITH AUTOMATED DIFF    Collection Time: 07/21/22  8:23 PM   Result Value Ref Range    WBC 12.4 4.6 - 13.2 K/uL    RBC 4.69 4.35 - 5.65 M/uL    HGB 14.7 13.0 - 16.0 g/dL    HCT 42.2 36.0 - 48.0 %    MCV 90.0 78.0 - 100.0 FL    MCH 31.3 24.0 - 34.0 PG    MCHC 34.8 31.0 - 37.0 g/dL    RDW 12.0 11.6 - 14.5 %    PLATELET 418 079 - 117 K/uL    MPV 9.1 (L) 9.2 - 11.8 FL    NRBC 0.0 0.0  WBC    ABSOLUTE NRBC 0.00 0.00 - 0.01 K/uL    NEUTROPHILS 75 (H) 40 - 73 %    LYMPHOCYTES 15 (L) 21 - 52 %    MONOCYTES 8 3 - 10 %    EOSINOPHILS 1 0 - 5 %    BASOPHILS 1 0 - 2 %    IMMATURE GRANULOCYTES 0 0 - 0.5 %    ABS. NEUTROPHILS 9.3 (H) 1.8 - 8.0 K/UL    ABS. LYMPHOCYTES 1.9 0.9 - 3.6 K/UL    ABS. MONOCYTES 1.0 0.05 - 1.2 K/UL    ABS. EOSINOPHILS 0.1 0.0 - 0.4 K/UL    ABS. BASOPHILS 0.1 0.0 - 0.1 K/UL    ABS. IMM. GRANS. 0.1 (H) 0.00 - 0.04 K/UL    DF AUTOMATED     METABOLIC PANEL, BASIC    Collection Time: 07/21/22  8:23 PM   Result Value Ref Range    Sodium 135 (L) 136 - 145 mmol/L    Potassium 3.7 3.5 - 5.5 mmol/L    Chloride 101 100 - 111 mmol/L    CO2 28 21 - 32 mmol/L    Anion gap 6 3.0 - 18.0 mmol/L    Glucose 99 74 - 99 mg/dL    BUN 11 7 - 18 mg/dL    Creatinine 1.21 0.60 - 1.30 mg/dL    BUN/Creatinine ratio 9 (L) 12 - 20      GFR est AA >60 >60 ml/min/1.73m2    GFR est non-AA >60 >60 ml/min/1.73m2    Calcium 9.3 8.5 - 10.1 mg/dL   LIPASE    Collection Time: 07/21/22  8:23 PM   Result Value Ref Range    Lipase 108 73 - 393 U/L   HEPATIC FUNCTION PANEL    Collection Time: 07/21/22  8:23 PM   Result Value Ref Range    Protein, total 8.3 (H) 6.4 - 8.2 g/dL    Albumin 3.8 3.4 - 5.0 g/dL    Globulin 4.5 (H) 2.0 - 4.0 g/dL    A-G Ratio 0.8 0.8 - 1.7      Bilirubin, total 1.9 (H) 0.2 - 1.0 mg/dL    Bilirubin, direct 0.3 (H) 0.0 - 0.2 mg/dL    Alk. phosphatase 91 45 - 117 U/L    AST (SGOT) 20 10 - 38 U/L    ALT (SGPT) 33 16 - 61 U/L   TROPONIN-HIGH SENSITIVITY    Collection Time: 07/21/22  8:23 PM   Result Value Ref Range    Troponin-High Sensitivity 5 0 - 78 ng/L       Radiologic Studies -   @lastxrresult@  CT Results  (Last 48 hours)                 07/20/22 2331  CT ABD PELV WO CONT Final result    Impression:      Gallstones with gallbladder wall thickening/pericholecystic fluid. The   gallbladder disease. No other significant abnormality seen.                Narrative:  EXAM: CT of the abdomen and pelvis       INDICATION: Pain. COMPARISON: None. TECHNIQUE: Axial CT imaging of the abdomen and pelvis was performed without   intravenous contrast. Multiplanar reformats were generated. Dose reduction   techniques used: automated exposure control, adjustment of the mAs and/or kVp   according to patient size, and iterative reconstruction techniques. Digital   imaging and communications in Medicine (DICOM) format image data are available   to nonaffiliated external healthcare facilities or entities on a secure, media   free, reciprocally searchable basis with patient authorization for at least 12   months after this study. _______________       FINDINGS:       LOWER CHEST: Unremarkable. LIVER, BILIARY: Liver is normal. No biliary dilation. Gallstones are noted. There is gallbladder wall thickening/pericholecystic fluid. PANCREAS: Normal.       SPLEEN: Normal.       ADRENALS: Normal.       KIDNEYS: Normal.       LYMPH NODES: No enlarged lymph nodes. GASTROINTESTINAL TRACT: No bowel dilation or wall thickening. The appendix is   visualized and is within normal limits. PELVIC ORGANS: Unremarkable. VASCULATURE: Unremarkable. BONES: No acute or aggressive osseous abnormalities identified. OTHER: None.       _______________                 CXR Results  (Last 48 hours)      None            Medical Decision Making and ED Course   - I am the first provider for this patient. I reviewed the vital signs, available nursing notes, past medical history, past surgical history, family history and social history. - Initial assessment performed. The patients presenting problems have been discussed, and they are in agreement with the care plan formulated and outlined with them. I have encouraged them to ask questions as they arise throughout their visit. Vital Signs-Reviewed the patient's vital signs.   Patient Vitals for the past 12 hrs:   Temp Pulse Resp BP SpO2   07/22/22 0248 -- -- -- -- 96 %   07/22/22 0238 98.3 °F (36.8 °C) 82 18 (!) 155/103 96 %       Differential Diagnosis & Medical Decision Making Provider Note:     MDM  Number of Diagnoses or Management Options  Cholecystitis  Diagnosis management comments: 51 y/o male transferred to this facility for surgical admission d/t cholecystitis. He presents non-toxic appearing, in no acute distress, and with stable vital signs. Will consult surgery for admission. Amount and/or Complexity of Data Reviewed  Review and summarize past medical records: yes  Discuss the patient with other providers: yes (Dr. Funmilayo Bacon)         ED Course:   Consult Note:  3:18 AM  Aleksander Stock PA-C spoke with Dr. Funmilayo Bacon,  general surgeon  Discussed pt's hx, disposition, and available diagnostic and imaging results. Reviewed care plans. Consultant agrees with plans as outlined. Dr. Funmilayo Bacon will admit the patient. Procedures   Performed by: Aleksander Stock PA-C  Procedures      Disposition   Disposition: Admitted to Operating Room the case was discussed with the admitting physician Dr. Funmilayo Bacon  Admitted      Diagnosis/Clinical Impression     Clinical Impression:   1. Cholecystitis        Attestations: Chely Corea PA-C, am the primary clinician of record. Please note that this dictation was completed with [employer], the computer voice recognition software. Quite often unanticipated grammatical, syntax, homophones, and other interpretive errors are inadvertently transcribed by the computer software. Please disregard these errors. Please excuse any errors that have escaped final proofreading. Thank you.

## 2022-07-22 NOTE — PROGRESS NOTES
Reason for Admission:  Acute cholecystitis                     RUR Score:  7%                   Plan for utilizing home health:     Not at this time     PCP: First and Last name:  None     Name of Practice:    Are you a current patient: Yes/No:    Approximate date of last visit:    Can you participate in a virtual visit with your PCP:                     Current Advanced Directive/Advance Care Plan: Full Code      Healthcare Decision Maker:   Click here to complete [address] including selection of the Healthcare Decision Maker Relationship (ie \"Primary\")           Ladan Farley, wife, [phone]                  Transition of Care Plan:       Met f/f with Pt, he confirmed that the information on the face sheet is correct. Pt stated that he lives with his wife and son. Pt stated that he is a  for [employer]. Pt stated no HH, no DME and independent with ADL. Pt stated that he uses [address] on [address] [facility] [address] Amaro Knows stated that his wife will give him a ride home when he is D/C. CM dispo: Home with no needs at this time.

## 2022-07-22 NOTE — ED NOTES
TRANSFER - OUT REPORT:    Verbal report given to Jeanna on Rafael Fuller  being transferred to [phone] for routine progression of care       Report consisted of patients Situation, Background, Assessment and   Recommendations(SBAR). Information from the following report(s) SBAR and ED Summary was reviewed with the receiving nurse. Lines:   Peripheral IV 07/21/22 Right Antecubital (Active)   Site Assessment Clean, dry, & intact 07/21/22 2042   Phlebitis Assessment 0 07/21/22 2042   Infiltration Assessment 0 07/21/22 2042   Dressing Status Clean, dry, & intact 07/21/22 2042   Dressing Type Transparent 07/21/22 2042   Hub Color/Line Status Patent; Flushed 07/21/22 2042   Action Taken Blood drawn 07/21/22 2042        Opportunity for questions and clarification was provided.       Patient transported with:   [employer]

## 2022-07-22 NOTE — PROGRESS NOTES
Neuro/ mobility:  AAOX4. Ambulatory patient    Nursing report:  Reports abdominal pain intermittently. Denies nausea. NPO - surgery scheduled for AM      Nursing Plan of Care:  Pain management.

## 2022-07-22 NOTE — Clinical Note
Status[de-identified] INPATIENT [101]   Type of Bed: Surgical [18]   Cardiac Monitoring Required?: No   Inpatient Hospitalization Certified Necessary for the Following Reasons: 2.  Patient admitted for Inpatient Only Procedure (Surgical)   Admitting Diagnosis: Cholecystitis [440200]   Admitting Physician: Karen Nava [[id number]]   Attending Physician: Karen Nava [[id number]]   Estimated Length of Stay: 3-4 Midnights   Discharge Plan[de-identified] Home with Office Follow-up

## 2022-07-22 NOTE — ED TRIAGE NOTES
Pt seen last night for gall stones. Pt refused admission. PT states he went home and his pain has gotten worse and is now back for admission and treatment.

## 2022-07-22 NOTE — CONSULTS
[facility] SURGERY H&P      I have been requested by Dr. Samson Mendez to manage this patient since he is leaving out of town. Chief Complaint: Abdominal pain   x  3 days    History of Present Illness:    Mr. Natalya Morton is a 52y.o. year old * male who presented initially for right upper abdominal pain. Patient states his pain started suddenly 2 days ago but admits to a poor diet and drinking large amounts of caffeine earlier in the morning. He has had similar pain for months in his right upper back but did not seek medical attention for it. Patient was initially evaluated at [facility] ED for his symptoms and reports CT at that time demonstrated gallstones; however he felt his pain had improved so he was discharged home. Once home patient's pain returned so he presented again to [facility] ED and then was transferred to [facility]. CT abdomen/pelvis was consistent with acute cholecystitis with gallstones and gallbladder wall thickening/pericholecystic fluid. Patient was subsequently started on IVF and Zosyn. He states his pain is currently improved after pain medications. Denies nausea or vomiting. Felt feverish initially when symptoms began but did not take his temperature and has not been febrile since his admission. Past Medical History: History reviewed. No pertinent past medical history. Past Surgical History: No past surgical history on file. Allergy:No Known Allergies    Social History:  reports that he has never smoked. He has never used smokeless tobacco. He reports that he does not drink alcohol and does not use drugs. Family History:History reviewed. No pertinent family history.      Current Medications:  Current Facility-Administered Medications:     sodium chloride (NS) flush 5-40 mL, 5-40 mL, IntraVENous, Q8H, Matt Sutherland MD, 10 mL at 07/22/22 1531    sodium chloride (NS) flush 5-40 mL, 5-40 mL, IntraVENous, PRN, Matt Sutherland MD    acetaminophen (TYLENOL) tablet 650 mg, 650 mg, Oral, Q6H PRN **OR** acetaminophen (TYLENOL) suppository 650 mg, 650 mg, Rectal, Q6H PRN, Ras Sutherland MD    polyethylene glycol (MIRALAX) packet 17 g, 17 g, Oral, DAILY PRN, Ras Sutherland MD    ondansetron (ZOFRAN ODT) tablet 4 mg, 4 mg, Oral, Q8H PRN **OR** ondansetron (ZOFRAN) injection 4 mg, 4 mg, IntraVENous, Q6H PRN, Ras Sutherland MD    enoxaparin (LOVENOX) injection 40 mg, 40 mg, SubCUTAneous, DAILY, Ras Sutherland MD    0.9% sodium chloride infusion, 75 mL/hr, IntraVENous, CONTINUOUS, Ras Sutherland MD, Last Rate: 75 mL/hr at 07/22/22 0521, 75 mL/hr at 07/22/22 0521    HYDROmorphone (DILAUDID) injection 1 mg, 1 mg, IntraVENous, Q4H PRN, Ras Sutherland MD, 1 mg at 07/22/22 1145    piperacillin-tazobactam (ZOSYN) 3.375 g in 0.9% sodium chloride (MBP/ADV) 100 mL MBP, 3.375 g, IntraVENous, Q8H, Ras Sutherland MD, Last Rate: 25 mL/hr at 07/22/22 1144, 3.375 g at 07/22/22 1144    *Please  patient's home medications from pharmacy at discharge*, 1 Each, Other, PRIOR TO DISCHARGE, Ras Sutherland MD     Immunization History: There is no immunization history on file for this patient. Complete    Review of Systems:     Constitutional:  no fever,  no chills,  no sweats, No weakness, No fatigue, No decreased activity. Eye: No recent visual problem, No icterus, No discharge, No double vision. Ear/Nose/Mouth/Throat: No decreased hearing, No ear pain, No nasal congestion, No sore throat. Respiratory: No shortness of breath, No cough, No sputum production, No hemoptysis, No wheezing, No cyanosis. Cardiovascular: No chest pain, No palpitations, No bradycardia, No tachycardia, No peripheral edema, No syncope. Gastrointestinal: No nausea,  No vomiting, No diarrhea, No constipation, No heartburn,  Abdominal pain. Genitourinary: No dysuria, No hematuria, No change in urine stream, No urethral discharge, No lesions.   Hematology/Lymphatics: No bruising tendency, No bleeding tendency, No petechiae, No swollen lymph glands. Endocrine: No excessive thirst, No polyuria, No cold intolerance, No heat intolerance, No excessive hunger. Immunologic: Not immunocompromised, No recurrent fevers, No recurrent infections. Musculoskeletal: No back pain, No neck pain, No joint pain, No muscle pain, No claudication, No decreased range of motion, No trauma. Integumentary: No rash, No pruritus, No abrasions. Neurologic: Alert and oriented X4, No abnormal balance, No headache, No confusion, No numbness, No tingling. Psychiatric: No anxiety, No depression, No mookie. Physical Exam:     Vitals & Measurements: Wt Readings from Last 3 Encounters:   07/22/22 103.9 kg (229 lb)   07/21/22 103.9 kg (229 lb)   07/20/22 103.9 kg (229 lb)     Temp Readings from Last 3 Encounters:   07/22/22 98.3 °F (36.8 °C)   07/21/22 98.5 °F (36.9 °C)   07/20/22 97.6 °F (36.4 °C)     BP Readings from Last 3 Encounters:   07/22/22 120/70   07/22/22 (!) 163/93   07/21/22 (!) 152/100     Pulse Readings from Last 3 Encounters:   07/22/22 (!) 102   07/22/22 78   07/21/22 84      Ht Readings from Last 3 Encounters:   07/22/22 5' 11\" (1.803 m)   07/21/22 5' 11\" (1.803 m)   07/20/22 5' 11\" (1.803 m)          General: well appearing, no acute distress  Head: Normal  Face: Nornal  HEENT: atraumatic, PERRLA, moist mucosa, normal pharynx, normal tonsils and adenoids, normal tongue, no fluid in sinuses  Neck: Trachea midline, no carotid bruit, no masses  Chest: Normal.  Respiratory: Normal chest wall expansion, CTA B, no r/r/w, no rubs  Cardiovascular: RRR, no m/r/g, Normal S1 and S2  Abdomen: Soft, non tender, non-distended, normal bowel sounds in all quadrants, no hepatosplenomegaly, no tympany. Incision scar: none  Genitourinary: No inguinal hernia, normal external gentalia, Testis & scrotum normal, no renal angle tenderness  Rectal: deferred  Musculoskeletal: normal ROM in upper and lower extremities, No joint swelling.   Integumentary: Warm, dry, and pink, with no rash, purpura, or petechia  Heme/Lymph: No lymphadenopathy, no bruises  Neurological:Cranial Nerves II-XII grossly intact, no gross motor or sensory deficit  Psychiatric: Cooperative with normal mood, affect, and cognition      Laboratory Values:   Recent Results (from the past 24 hour(s))   CBC WITH AUTOMATED DIFF    Collection Time: 07/21/22  8:23 PM   Result Value Ref Range    WBC 12.4 4.6 - 13.2 K/uL    RBC 4.69 4.35 - 5.65 M/uL    HGB 14.7 13.0 - 16.0 g/dL    HCT 42.2 36.0 - 48.0 %    MCV 90.0 78.0 - 100.0 FL    MCH 31.3 24.0 - 34.0 PG    MCHC 34.8 31.0 - 37.0 g/dL    RDW 12.0 11.6 - 14.5 %    PLATELET 553 727 - 243 K/uL    MPV 9.1 (L) 9.2 - 11.8 FL    NRBC 0.0 0.0  WBC    ABSOLUTE NRBC 0.00 0.00 - 0.01 K/uL    NEUTROPHILS 75 (H) 40 - 73 %    LYMPHOCYTES 15 (L) 21 - 52 %    MONOCYTES 8 3 - 10 %    EOSINOPHILS 1 0 - 5 %    BASOPHILS 1 0 - 2 %    IMMATURE GRANULOCYTES 0 0 - 0.5 %    ABS. NEUTROPHILS 9.3 (H) 1.8 - 8.0 K/UL    ABS. LYMPHOCYTES 1.9 0.9 - 3.6 K/UL    ABS. MONOCYTES 1.0 0.05 - 1.2 K/UL    ABS. EOSINOPHILS 0.1 0.0 - 0.4 K/UL    ABS. BASOPHILS 0.1 0.0 - 0.1 K/UL    ABS. IMM.  GRANS. 0.1 (H) 0.00 - 0.04 K/UL    DF AUTOMATED     METABOLIC PANEL, BASIC    Collection Time: 07/21/22  8:23 PM   Result Value Ref Range    Sodium 135 (L) 136 - 145 mmol/L    Potassium 3.7 3.5 - 5.5 mmol/L    Chloride 101 100 - 111 mmol/L    CO2 28 21 - 32 mmol/L    Anion gap 6 3.0 - 18.0 mmol/L    Glucose 99 74 - 99 mg/dL    BUN 11 7 - 18 mg/dL    Creatinine 1.21 0.60 - 1.30 mg/dL    BUN/Creatinine ratio 9 (L) 12 - 20      GFR est AA >60 >60 ml/min/1.73m2    GFR est non-AA >60 >60 ml/min/1.73m2    Calcium 9.3 8.5 - 10.1 mg/dL   LIPASE    Collection Time: 07/21/22  8:23 PM   Result Value Ref Range    Lipase 108 73 - 393 U/L   HEPATIC FUNCTION PANEL    Collection Time: 07/21/22  8:23 PM   Result Value Ref Range    Protein, total 8.3 (H) 6.4 - 8.2 g/dL    Albumin 3.8 3.4 - 5.0 g/dL    Globulin 4.5 (H) 2.0 - 4.0 g/dL    A-G Ratio 0.8 0.8 - 1.7      Bilirubin, total 1.9 (H) 0.2 - 1.0 mg/dL    Bilirubin, direct 0.3 (H) 0.0 - 0.2 mg/dL    Alk. phosphatase 91 45 - 117 U/L    AST (SGOT) 20 10 - 38 U/L    ALT (SGPT) 33 16 - 61 U/L   TROPONIN-HIGH SENSITIVITY    Collection Time: 07/21/22  8:23 PM   Result Value Ref Range    Troponin-High Sensitivity 5 0 - 78 ng/L   EKG, 12 LEAD, INITIAL    Collection Time: 07/21/22  9:07 PM   Result Value Ref Range    Ventricular Rate 86 BPM    Atrial Rate 86 BPM    P-R Interval 148 ms    QRS Duration 84 ms    Q-T Interval 349 ms    QTC Calculation (Bezet) 418 ms    Calculated P Axis 55 degrees    Calculated R Axis 60 degrees    Calculated T Axis -15 degrees    Diagnosis       Sinus rhythm  Probable left atrial enlargement  Borderline T abnormalities, inferior leads    Confirmed by DONNA Garcia ([id number]) on 7/22/2022 1:28:06 PM           No orders to display       Assessment:  Problem List Items Addressed This Visit          Digestive    Cholecystitis - Primary        Plan:    Admission  Diet: NPO  IV fluids  SCD  IS  Pain medications  Antibiotics: Zosyn  Nausea medication  Labs in am  OR in the AM  Procedure/Surgery: Lap cholecystectomy, possible open   12. Risk, benefits and complications including bleeding, infection, dvt, pe, mi, stroke, sepsis, injury to bowel, bile duct, bile leak,  dehiscence, open, discussed, questions answered, patient & family clearly understands and agrees with plan. All their questions were answered to their satisfaction. RN was present during entire conversation. Thank you for the consultation & allowing me to participate in the care of this patient.

## 2022-07-22 NOTE — ED PROVIDER NOTES
Pt c/o rt upper abd pain, x 3 days, worsening. No fever. Mild nausea, no vomiting. Pain worse w eating. Rad to rt mid back. No urinary changes. No leg pain. Seen last nt dx w gallstones, wanted to go home, but taking percocet today without much relief. Pain still 7/10. No injury. No diarrhea. No past medical history on file. No past surgical history on file. No family history on file. Social History     Socioeconomic History    Marital status:      Spouse name: Not on file    Number of children: Not on file    Years of education: Not on file    Highest education level: Not on file   Occupational History    Not on file   Tobacco Use    Smoking status: Never    Smokeless tobacco: Never   Substance and Sexual Activity    Alcohol use: Never    Drug use: Never    Sexual activity: Not on file   Other Topics Concern    Not on file   Social History Narrative    Not on file     Social Determinants of Health     Financial Resource Strain: Not on file   Food Insecurity: Not on file   Transportation Needs: Not on file   Physical Activity: Not on file   Stress: Not on file   Social Connections: Not on file   Intimate Partner Violence: Not on file   Housing Stability: Not on file         ALLERGIES: Patient has no known allergies. Review of Systems   Constitutional:  Negative for diaphoresis and fever. HENT:  Negative for congestion. Respiratory:  Negative for cough and shortness of breath. Cardiovascular:  Negative for palpitations. Gastrointestinal:  Positive for abdominal pain and nausea. Genitourinary:  Negative for dysuria. Musculoskeletal:  Positive for back pain. Skin:  Negative for rash. Neurological:  Negative for dizziness. All other systems reviewed and are negative.     Vitals:    07/21/22 2018   BP: (!) 150/97   Pulse: 99   Resp: 18   Temp: 98.5 °F (36.9 °C)   SpO2: 99%   Weight: 103.9 kg (229 lb)   Height: 5' 11\" (1.803 m)            Physical Exam  Vitals and nursing note reviewed. Constitutional:       Appearance: He is well-developed. HENT:      Head: Normocephalic and atraumatic. Eyes:      Conjunctiva/sclera: Conjunctivae normal.   Cardiovascular:      Rate and Rhythm: Normal rate and regular rhythm. Pulmonary:      Effort: Pulmonary effort is normal.      Breath sounds: No wheezing. Abdominal:      Palpations: Abdomen is soft. Tenderness: There is abdominal tenderness in the right upper quadrant. Musculoskeletal:         General: No tenderness. Cervical back: Normal range of motion. Skin:     General: Skin is warm and dry. Capillary Refill: Capillary refill takes less than 2 seconds. Findings: No rash. Neurological:      Mental Status: He is alert and oriented to person, place, and time.         MDM         Procedures    Vitals:  Patient Vitals for the past 12 hrs:   Temp Pulse Resp BP SpO2   07/21/22 2018 98.5 °F (36.9 °C) 99 18 (!) 150/97 99 %         Medications ordered:   Medications   cefTRIAXone (ROCEPHIN) 1 g in 0.9% sodium chloride (MBP/ADV) 50 mL MBP (has no administration in time range)   HYDROmorphone (DILAUDID) injection 0.5 mg (0.5 mg IntraVENous Given 7/21/22 2057)   ondansetron (ZOFRAN) injection 4 mg (4 mg IntraVENous Given 7/21/22 2057)   0.9% sodium chloride infusion 1,000 mL (1,000 mL IntraVENous New Bag 7/21/22 2057)         Lab findings:  Recent Results (from the past 12 hour(s))   CBC WITH AUTOMATED DIFF    Collection Time: 07/21/22  8:23 PM   Result Value Ref Range    WBC 12.4 4.6 - 13.2 K/uL    RBC 4.69 4.35 - 5.65 M/uL    HGB 14.7 13.0 - 16.0 g/dL    HCT 42.2 36.0 - 48.0 %    MCV 90.0 78.0 - 100.0 FL    MCH 31.3 24.0 - 34.0 PG    MCHC 34.8 31.0 - 37.0 g/dL    RDW 12.0 11.6 - 14.5 %    PLATELET 649 059 - 839 K/uL    MPV 9.1 (L) 9.2 - 11.8 FL    NRBC 0.0 0.0  WBC    ABSOLUTE NRBC 0.00 0.00 - 0.01 K/uL    NEUTROPHILS 75 (H) 40 - 73 %    LYMPHOCYTES 15 (L) 21 - 52 %    MONOCYTES 8 3 - 10 %    EOSINOPHILS 1 0 - 5 %    BASOPHILS 1 0 - 2 %    IMMATURE GRANULOCYTES 0 0 - 0.5 %    ABS. NEUTROPHILS 9.3 (H) 1.8 - 8.0 K/UL    ABS. LYMPHOCYTES 1.9 0.9 - 3.6 K/UL    ABS. MONOCYTES 1.0 0.05 - 1.2 K/UL    ABS. EOSINOPHILS 0.1 0.0 - 0.4 K/UL    ABS. BASOPHILS 0.1 0.0 - 0.1 K/UL    ABS. IMM. GRANS. 0.1 (H) 0.00 - 0.04 K/UL    DF AUTOMATED     METABOLIC PANEL, BASIC    Collection Time: 07/21/22  8:23 PM   Result Value Ref Range    Sodium 135 (L) 136 - 145 mmol/L    Potassium 3.7 3.5 - 5.5 mmol/L    Chloride 101 100 - 111 mmol/L    CO2 28 21 - 32 mmol/L    Anion gap 6 3.0 - 18.0 mmol/L    Glucose 99 74 - 99 mg/dL    BUN 11 7 - 18 mg/dL    Creatinine 1.21 0.60 - 1.30 mg/dL    BUN/Creatinine ratio 9 (L) 12 - 20      GFR est AA >60 >60 ml/min/1.73m2    GFR est non-AA >60 >60 ml/min/1.73m2    Calcium 9.3 8.5 - 10.1 mg/dL   LIPASE    Collection Time: 07/21/22  8:23 PM   Result Value Ref Range    Lipase 108 73 - 393 U/L   HEPATIC FUNCTION PANEL    Collection Time: 07/21/22  8:23 PM   Result Value Ref Range    Protein, total 8.3 (H) 6.4 - 8.2 g/dL    Albumin 3.8 3.4 - 5.0 g/dL    Globulin 4.5 (H) 2.0 - 4.0 g/dL    A-G Ratio 0.8 0.8 - 1.7      Bilirubin, total 1.9 (H) 0.2 - 1.0 mg/dL    Bilirubin, direct 0.3 (H) 0.0 - 0.2 mg/dL    Alk. phosphatase 91 45 - 117 U/L    AST (SGOT) 20 10 - 38 U/L    ALT (SGPT) 33 16 - 61 U/L   TROPONIN-HIGH SENSITIVITY    Collection Time: 07/21/22  8:23 PM   Result Value Ref Range    Troponin-High Sensitivity 5 0 - 78 ng/L           X-Ray, CT or other radiology findings or impressions:  No orders to display       Ekg: nsr at 86, qrs 84, axis 60, no ectopy, no st changes, t wave inv inferiorly. Progress notes, Consult notes or additional Procedure notes:   Ct from prior visit noted.  Concern for cholecystitis, sig pain, t bilirubin increasing now 1.9 and d bili elevated at 1.3, paged surgery to discuss  9:53 PM dw dr Ag Quesada who is concerned for a cbd stone, due to inc t bili/d bili, declines admit here and recommends transfer to higher level of care where ercp available, likely [facility] or [facility]   D/w pt, verb und of need for transfer, agrees w tx to [facility]  10:04 PM d/ w dr Sharonda Thomas at [facility] [facility] , to accept pt      Diagnosis:   1. Cholecystitis        Disposition: admit    Follow-up Information    None          Patient's Medications   Start Taking    No medications on file   Continue Taking    AMOXICILLIN 500 MG TAB    Take 500 mg by mouth three (3) times daily for 7 days. ONDANSETRON (ZOFRAN ODT) 4 MG DISINTEGRATING TABLET    Take 1 Tablet by mouth every eight (8) hours as needed for Nausea or Vomiting. OXYCODONE-ACETAMINOPHEN (PERCOCET) 5-325 MG PER TABLET    Take 1 Tablet by mouth every six (6) hours as needed for Pain for up to 7 days. Max Daily Amount: 4 Tablets.    These Medications have changed    No medications on file   Stop Taking    No medications on file

## 2022-07-22 NOTE — ED TRIAGE NOTES
Patient transferred from [address] [place] [address] for abdominal pain found to have gallstones

## 2022-07-23 ENCOUNTER — ANESTHESIA (OUTPATIENT)
Dept: SURGERY | Age: 47
DRG: 414 | End: 2022-07-23
Payer: COMMERCIAL

## 2022-07-23 ENCOUNTER — ANESTHESIA EVENT (OUTPATIENT)
Dept: ENDOSCOPY | Age: 47
DRG: 414 | End: 2022-07-23
Payer: COMMERCIAL

## 2022-07-23 ENCOUNTER — APPOINTMENT (OUTPATIENT)
Dept: ENDOSCOPY | Age: 47
DRG: 414 | End: 2022-07-23
Attending: INTERNAL MEDICINE
Payer: COMMERCIAL

## 2022-07-23 ENCOUNTER — ANESTHESIA EVENT (OUTPATIENT)
Dept: SURGERY | Age: 47
DRG: 414 | End: 2022-07-23
Payer: COMMERCIAL

## 2022-07-23 ENCOUNTER — APPOINTMENT (OUTPATIENT)
Dept: GENERAL RADIOLOGY | Age: 47
DRG: 414 | End: 2022-07-23
Attending: INTERNAL MEDICINE
Payer: COMMERCIAL

## 2022-07-23 ENCOUNTER — ANESTHESIA (OUTPATIENT)
Dept: ENDOSCOPY | Age: 47
DRG: 414 | End: 2022-07-23
Payer: COMMERCIAL

## 2022-07-23 LAB
ALBUMIN SERPL-MCNC: 3.3 G/DL (ref 3.5–5)
ALBUMIN/GLOB SERPL: 1 {RATIO} (ref 1.1–2.2)
ALP SERPL-CCNC: 101 U/L (ref 45–117)
ALT SERPL-CCNC: 47 U/L (ref 12–78)
AST SERPL W P-5'-P-CCNC: 41 U/L (ref 15–37)
BASOPHILS # BLD: 0.1 K/UL (ref 0–0.1)
BASOPHILS NFR BLD: 1 % (ref 0–1)
BILIRUB DIRECT SERPL-MCNC: 0.4 MG/DL (ref 0–0.2)
BILIRUB SERPL-MCNC: 2.6 MG/DL (ref 0.2–1)
DIFFERENTIAL METHOD BLD: NORMAL
EOSINOPHIL # BLD: 0.2 K/UL (ref 0–0.4)
EOSINOPHIL NFR BLD: 2 % (ref 0–7)
ERYTHROCYTE [DISTWIDTH] IN BLOOD BY AUTOMATED COUNT: 11.7 % (ref 11.5–14.5)
GLOBULIN SER CALC-MCNC: 3.3 G/DL (ref 2–4)
HCT VFR BLD AUTO: 38.3 % (ref 36.6–50.3)
HGB BLD-MCNC: 13.3 G/DL (ref 12.1–17)
IMM GRANULOCYTES # BLD AUTO: 0 K/UL (ref 0–0.04)
IMM GRANULOCYTES NFR BLD AUTO: 0 % (ref 0–0.5)
LYMPHOCYTES # BLD: 1.2 K/UL (ref 0.8–3.5)
LYMPHOCYTES NFR BLD: 15 % (ref 12–49)
MCH RBC QN AUTO: 31.2 PG (ref 26–34)
MCHC RBC AUTO-ENTMCNC: 34.7 G/DL (ref 30–36.5)
MCV RBC AUTO: 89.9 FL (ref 80–99)
MONOCYTES # BLD: 0.7 K/UL (ref 0–1)
MONOCYTES NFR BLD: 9 % (ref 5–13)
NEUTS SEG # BLD: 5.9 K/UL (ref 1.8–8)
NEUTS SEG NFR BLD: 73 % (ref 32–75)
NRBC # BLD: 0 K/UL (ref 0–0.01)
NRBC BLD-RTO: 0 PER 100 WBC
PLATELET # BLD AUTO: 234 K/UL (ref 150–400)
PMV BLD AUTO: 9.3 FL (ref 8.9–12.9)
PROT SERPL-MCNC: 6.6 G/DL (ref 6.4–8.2)
RBC # BLD AUTO: 4.26 M/UL (ref 4.1–5.7)
WBC # BLD AUTO: 8.1 K/UL (ref 4.1–11.1)

## 2022-07-23 PROCEDURE — 0F798ZZ DILATION OF COMMON BILE DUCT, VIA NATURAL OR ARTIFICIAL OPENING ENDOSCOPIC: ICD-10-PCS | Performed by: INTERNAL MEDICINE

## 2022-07-23 PROCEDURE — 74011250636 HC RX REV CODE- 250/636: Performed by: NURSE ANESTHETIST, CERTIFIED REGISTERED

## 2022-07-23 PROCEDURE — 74011250636 HC RX REV CODE- 250/636: Performed by: SURGERY

## 2022-07-23 PROCEDURE — 77030012596 HC SPHNTOM BILI BSC -E: Performed by: INTERNAL MEDICINE

## 2022-07-23 PROCEDURE — 76000 FLUOROSCOPY <1 HR PHYS/QHP: CPT

## 2022-07-23 PROCEDURE — 76060000033 HC ANESTHESIA 1 TO 1.5 HR: Performed by: INTERNAL MEDICINE

## 2022-07-23 PROCEDURE — 36415 COLL VENOUS BLD VENIPUNCTURE: CPT

## 2022-07-23 PROCEDURE — G0378 HOSPITAL OBSERVATION PER HR: HCPCS

## 2022-07-23 PROCEDURE — 96376 TX/PRO/DX INJ SAME DRUG ADON: CPT

## 2022-07-23 PROCEDURE — 74011250637 HC RX REV CODE- 250/637: Performed by: SURGERY

## 2022-07-23 PROCEDURE — 3E0333Z INTRODUCTION OF ANTI-INFLAMMATORY INTO PERIPHERAL VEIN, PERCUTANEOUS APPROACH: ICD-10-PCS | Performed by: NURSE ANESTHETIST, CERTIFIED REGISTERED

## 2022-07-23 PROCEDURE — 77030016825: Performed by: INTERNAL MEDICINE

## 2022-07-23 PROCEDURE — 85025 COMPLETE CBC W/AUTO DIFF WBC: CPT

## 2022-07-23 PROCEDURE — 76040000008: Performed by: INTERNAL MEDICINE

## 2022-07-23 PROCEDURE — 74011000258 HC RX REV CODE- 258: Performed by: SURGERY

## 2022-07-23 PROCEDURE — 2709999900 HC NON-CHARGEABLE SUPPLY: Performed by: INTERNAL MEDICINE

## 2022-07-23 PROCEDURE — 74011000250 HC RX REV CODE- 250: Performed by: NURSE ANESTHETIST, CERTIFIED REGISTERED

## 2022-07-23 PROCEDURE — 80076 HEPATIC FUNCTION PANEL: CPT

## 2022-07-23 PROCEDURE — 74011000250 HC RX REV CODE- 250: Performed by: SURGERY

## 2022-07-23 PROCEDURE — 77030009038 HC CATH BILI STN RTVR BSC -C: Performed by: INTERNAL MEDICINE

## 2022-07-23 RX ORDER — PROPOFOL 10 MG/ML
INJECTION, EMULSION INTRAVENOUS AS NEEDED
Status: DISCONTINUED | OUTPATIENT
Start: 2022-07-23 | End: 2022-07-23 | Stop reason: HOSPADM

## 2022-07-23 RX ORDER — DEXMEDETOMIDINE HYDROCHLORIDE 100 UG/ML
INJECTION, SOLUTION INTRAVENOUS AS NEEDED
Status: DISCONTINUED | OUTPATIENT
Start: 2022-07-23 | End: 2022-07-23 | Stop reason: HOSPADM

## 2022-07-23 RX ORDER — ONDANSETRON 2 MG/ML
INJECTION INTRAMUSCULAR; INTRAVENOUS
Status: COMPLETED
Start: 2022-07-23 | End: 2022-07-24

## 2022-07-23 RX ORDER — PROPOFOL 10 MG/ML
INJECTION, EMULSION INTRAVENOUS
Status: COMPLETED
Start: 2022-07-23 | End: 2022-07-24

## 2022-07-23 RX ORDER — LIDOCAINE HYDROCHLORIDE 20 MG/ML
INJECTION, SOLUTION EPIDURAL; INFILTRATION; INTRACAUDAL; PERINEURAL AS NEEDED
Status: DISCONTINUED | OUTPATIENT
Start: 2022-07-23 | End: 2022-07-23 | Stop reason: HOSPADM

## 2022-07-23 RX ORDER — DEXAMETHASONE SODIUM PHOSPHATE 4 MG/ML
INJECTION, SOLUTION INTRA-ARTICULAR; INTRALESIONAL; INTRAMUSCULAR; INTRAVENOUS; SOFT TISSUE AS NEEDED
Status: DISCONTINUED | OUTPATIENT
Start: 2022-07-23 | End: 2022-07-23 | Stop reason: HOSPADM

## 2022-07-23 RX ORDER — DEXAMETHASONE SODIUM PHOSPHATE 4 MG/ML
INJECTION, SOLUTION INTRA-ARTICULAR; INTRALESIONAL; INTRAMUSCULAR; INTRAVENOUS; SOFT TISSUE
Status: COMPLETED
Start: 2022-07-23 | End: 2022-07-24

## 2022-07-23 RX ORDER — SODIUM CHLORIDE, SODIUM LACTATE, POTASSIUM CHLORIDE, CALCIUM CHLORIDE 600; 310; 30; 20 MG/100ML; MG/100ML; MG/100ML; MG/100ML
INJECTION, SOLUTION INTRAVENOUS
Status: DISCONTINUED | OUTPATIENT
Start: 2022-07-23 | End: 2022-07-23 | Stop reason: HOSPADM

## 2022-07-23 RX ORDER — ONDANSETRON 2 MG/ML
INJECTION INTRAMUSCULAR; INTRAVENOUS AS NEEDED
Status: DISCONTINUED | OUTPATIENT
Start: 2022-07-23 | End: 2022-07-23 | Stop reason: HOSPADM

## 2022-07-23 RX ORDER — SUCCINYLCHOLINE CHLORIDE 20 MG/ML
INJECTION INTRAMUSCULAR; INTRAVENOUS AS NEEDED
Status: DISCONTINUED | OUTPATIENT
Start: 2022-07-23 | End: 2022-07-23 | Stop reason: HOSPADM

## 2022-07-23 RX ADMIN — ONDANSETRON 4 MG: 2 INJECTION INTRAMUSCULAR; INTRAVENOUS at 12:05

## 2022-07-23 RX ADMIN — DEXMEDETOMIDINE HYDROCHLORIDE 20 MCG: 100 INJECTION, SOLUTION INTRAVENOUS at 12:03

## 2022-07-23 RX ADMIN — SODIUM CHLORIDE, PRESERVATIVE FREE 10 ML: 5 INJECTION INTRAVENOUS at 05:04

## 2022-07-23 RX ADMIN — SODIUM CHLORIDE, POTASSIUM CHLORIDE, SODIUM LACTATE AND CALCIUM CHLORIDE: 600; 310; 30; 20 INJECTION, SOLUTION INTRAVENOUS at 11:43

## 2022-07-23 RX ADMIN — SODIUM CHLORIDE, PRESERVATIVE FREE 10 ML: 5 INJECTION INTRAVENOUS at 22:10

## 2022-07-23 RX ADMIN — POLYETHYLENE GLYCOL 3350 17 G: 17 POWDER, FOR SOLUTION ORAL at 13:37

## 2022-07-23 RX ADMIN — SUCCINYLCHOLINE CHLORIDE 140 MG: 20 INJECTION, SOLUTION INTRAMUSCULAR; INTRAVENOUS at 11:50

## 2022-07-23 RX ADMIN — PHENYLEPHRINE HYDROCHLORIDE 200 MCG: 10 INJECTION INTRAVENOUS at 12:21

## 2022-07-23 RX ADMIN — PROPOFOL 200 MG: 10 INJECTION, EMULSION INTRAVENOUS at 11:50

## 2022-07-23 RX ADMIN — PIPERACILLIN AND TAZOBACTAM 3.38 G: 3; .375 INJECTION, POWDER, LYOPHILIZED, FOR SOLUTION INTRAVENOUS at 03:53

## 2022-07-23 RX ADMIN — PIPERACILLIN AND TAZOBACTAM 3.38 G: 3; .375 INJECTION, POWDER, LYOPHILIZED, FOR SOLUTION INTRAVENOUS at 13:32

## 2022-07-23 RX ADMIN — PIPERACILLIN AND TAZOBACTAM 3.38 G: 3; .375 INJECTION, POWDER, LYOPHILIZED, FOR SOLUTION INTRAVENOUS at 20:19

## 2022-07-23 RX ADMIN — SODIUM CHLORIDE 75 ML/HR: 9 INJECTION, SOLUTION INTRAVENOUS at 08:07

## 2022-07-23 RX ADMIN — PHENYLEPHRINE HYDROCHLORIDE 200 MCG: 10 INJECTION INTRAVENOUS at 12:17

## 2022-07-23 RX ADMIN — SODIUM CHLORIDE, PRESERVATIVE FREE 10 ML: 5 INJECTION INTRAVENOUS at 01:08

## 2022-07-23 RX ADMIN — DEXAMETHASONE SODIUM PHOSPHATE 4 MG: 4 INJECTION, SOLUTION INTRA-ARTICULAR; INTRALESIONAL; INTRAMUSCULAR; INTRAVENOUS; SOFT TISSUE at 12:05

## 2022-07-23 RX ADMIN — LIDOCAINE HYDROCHLORIDE 100 MG: 20 INJECTION, SOLUTION EPIDURAL; INFILTRATION; INTRACAUDAL; PERINEURAL at 11:50

## 2022-07-23 NOTE — PROGRESS NOTES
Problem: Pain  Goal: *Control of Pain  Outcome: Progressing Towards Goal  Goal: *PALLIATIVE CARE:  Alleviation of Pain  Outcome: Progressing Towards Goal     Problem: Patient Education: Go to Patient Education Activity  Goal: Patient/Family Education  Outcome: Progressing Towards Goal     Problem: Falls - Risk of  Goal: *Absence of Falls  Description: Document Cinda Smith Fall Risk and appropriate interventions in the flowsheet.   Outcome: Progressing Towards Goal  Note: Fall Risk Interventions:            Medication Interventions: Patient to call before getting OOB, Teach patient to arise slowly                   Problem: Patient Education: Go to Patient Education Activity  Goal: Patient/Family Education  Outcome: Progressing Towards Goal

## 2022-07-23 NOTE — CONSULTS
Consult    Patient: Jone Gonzalez MRN: [id number]  SSN: [id number]    YOB: 1975  Age: 52 y.o. Sex: male      Subjective:      Jone Gonzalez is a 52 y.o. male who is being seen for CBD stone, jaundice,  A 52years old gentleman who had epigastric pain radiated to the back, to outside hospital emergent, room, was transferred here for possible cholecystectomy, today patient blood test indicated had elevated of bilirubin, transaminase, were about a CBD stone GI consultation placed, patient has no fever no chills now    History reviewed. No pertinent past medical history. No past surgical history on file. History reviewed. No pertinent family history.   Social History     Tobacco Use    Smoking status: Never    Smokeless tobacco: Never   Substance Use Topics    Alcohol use: Never      Current Facility-Administered Medications   Medication Dose Route Frequency Provider Last Rate Last Admin    sodium chloride (NS) flush 5-40 mL  5-40 mL IntraVENous Q8H Percy Sutherland MD   10 mL at 07/23/22 0504    sodium chloride (NS) flush 5-40 mL  5-40 mL IntraVENous PRN Percy Sutherland MD        acetaminophen (TYLENOL) tablet 650 mg  650 mg Oral Q6H PRN Percy Sutherland MD        Or    acetaminophen (TYLENOL) suppository 650 mg  650 mg Rectal Q6H PRN Percy Sutherland MD        polyethylene glycol (MIRALAX) packet 17 g  17 g Oral DAILY PRN Percy Sutherland MD        ondansetron (ZOFRAN ODT) tablet 4 mg  4 mg Oral Q8H PRN Percy Sutherland MD        Or    [facility]ron [facility]) injection 4 mg  4 mg IntraVENous Q6H PRN Percy Sutherland MD        enoxaparin (LOVENOX) injection 40 mg  40 mg SubCUTAneous DAILY Percy Sutherland MD        0.9% sodium chloride infusion  75 mL/hr IntraVENous CONTINUOUS Percy Sutherland MD 75 mL/hr at 07/23/22 0807 75 mL/hr at 07/23/22 0807    HYDROmorphone (DILAUDID) injection 1 mg  1 mg IntraVENous Q4H PRN Percy Sutherland MD   1 mg at 07/22/22 1719    piperacillin-tazobactam ([place]) 3.375 g in 0.9% sodium chloride (MBP/ADV) 100 mL MBP  3.375 g IntraVENous Q8H Saeid Sutherland MD 25 mL/hr at 07/23/22 0353 3.375 g at 07/23/22 7760    *Please  patient's home medications from pharmacy at discharge*  1 Each Other PRIOR TO DISCHARGE Saeid Sutherland MD            No Known Allergies    Review of Systems:  Review of Systems   Constitutional: Negative. HENT: Negative. Eyes: Negative. Respiratory: Negative. Cardiovascular: Negative. Gastrointestinal:  Positive for abdominal pain. Musculoskeletal: Negative. Skin: Negative. Psychiatric/Behavioral: Negative. Objective:     Vitals:    07/22/22 1516 07/22/22 2007 07/23/22 0250 07/23/22 0913   BP: 120/70 113/66 116/78 127/70   Pulse: (!) 102 (!) 104 100 88   Resp: 20 16 18 20   Temp: 98.3 °F (36.8 °C) 98 °F (36.7 °C) 98 °F (36.7 °C) 98.3 °F (36.8 °C)   SpO2: 95% 96% 95% 96%   Weight:       Height:            Physical Exam:  Physical Exam  Constitutional:       Appearance: He is ill-appearing. HENT:      Head: Atraumatic. Mouth/Throat:      Mouth: Mucous membranes are dry. Eyes:      General: No scleral icterus. Cardiovascular:      Rate and Rhythm: Normal rate. Heart sounds: Normal heart sounds. Abdominal:      Tenderness: There is abdominal tenderness. Musculoskeletal:         General: Normal range of motion. Cervical back: Normal range of motion. Skin:     General: Skin is warm. Neurological:      General: No focal deficit present. Mental Status: Mental status is at baseline.    Psychiatric:         Mood and Affect: Mood normal.        Recent Results (from the past 24 hour(s))   CBC WITH AUTOMATED DIFF    Collection Time: 07/23/22  6:15 AM   Result Value Ref Range    WBC 8.1 4.1 - 11.1 K/uL    RBC 4.26 4.10 - 5.70 M/uL    HGB 13.3 12.1 - 17.0 g/dL    HCT 38.3 36.6 - 50.3 %    MCV 89.9 80.0 - 99.0 FL    MCH 31.2 26.0 - 34.0 PG    MCHC 34.7 30.0 - 36.5 g/dL    RDW 11.7 11.5 - 14.5 % PLATELET 469 587 - 830 K/uL    MPV 9.3 8.9 - 12.9 FL    NRBC 0.0 0.0  WBC    ABSOLUTE NRBC 0.00 0.00 - 0.01 K/uL    NEUTROPHILS 73 32 - 75 %    LYMPHOCYTES 15 12 - 49 %    MONOCYTES 9 5 - 13 %    EOSINOPHILS 2 0 - 7 %    BASOPHILS 1 0 - 1 %    IMMATURE GRANULOCYTES 0 0 - 0.5 %    ABS. NEUTROPHILS 5.9 1.8 - 8.0 K/UL    ABS. LYMPHOCYTES 1.2 0.8 - 3.5 K/UL    ABS. MONOCYTES 0.7 0.0 - 1.0 K/UL    ABS. EOSINOPHILS 0.2 0.0 - 0.4 K/UL    ABS. BASOPHILS 0.1 0.0 - 0.1 K/UL    ABS. IMM. GRANS. 0.0 0.00 - 0.04 K/UL    DF AUTOMATED     HEPATIC FUNCTION PANEL    Collection Time: 07/23/22  6:15 AM   Result Value Ref Range    Protein, total 6.6 6.4 - 8.2 g/dL    Albumin 3.3 (L) 3.5 - 5.0 g/dL    Globulin 3.3 2.0 - 4.0 g/dL    A-G Ratio 1.0 (L) 1.1 - 2.2      Bilirubin, total 2.6 (H) 0.2 - 1.0 mg/dL    Bilirubin, direct 0.4 (H) 0.0 - 0.2 mg/dL    Alk. phosphatase 101 45 - 117 U/L    AST (SGOT) 41 (H) 15 - 37 U/L    ALT (SGPT) 47 12 - 78 U/L        XR FLUOROSCOPY UNDER 60 MINUTES    (Results Pending)      Assessment:      Gallstones with gallbladder wall thickening/pericholecystic fluid. The  gallbladder disease. No other significant abnormality seen. Hospital Problems  Never Reviewed            Codes Class Noted POA    Cholecystitis ICD-10-CM: K81.9  ICD-9-CM: 575.10  7/22/2022 Unknown        Acute cholecystitis ICD-10-CM: K81.0  ICD-9-CM: 575.0  7/22/2022 Unknown          Cholecystitis - Primary    CBD stone  Abdominal pain, jaundice,   Worried  about CBD stone        Plan:     Diet: NPO  IV fluids       Pain medications, Nausea medication  Antibiotics  Unable to MRCPP since the machine is broken and will not be available for several days. Will do an ERCP, today, indication risk, option discussed with patient, patient's surgeon.   Patient agreed with the procedure,    Signed By: Asif Olson MD     July 23, 2022         Thank you for allowing me to participate in this patients care  Cc Referring Physician   None

## 2022-07-23 NOTE — PERIOP NOTES
TRANSFER - OUT REPORT:    Verbal report given to Za Allen RN (name) on Natalia De Leon  being transferred to [facility] (unit) for routine post - op       Report consisted of patients Situation, Background, Assessment and   Recommendations(SBAR). Information from the following report(s) Procedure Summary and MAR was reviewed with the receiving nurse. Opportunity for questions and clarification was provided.       Patient transported with:   O2 @ 2 liters  Registered Nurse

## 2022-07-23 NOTE — PROGRESS NOTES
Neuro/ mobility:  AAOX4. Ambulatory patient    Nursing report:  ERCP completed today. Surgery scheduled for Sunday - however MD advised patient of either Sunday or Monday surgery. Denies nausea. Continued NPO. Nursing Plan of Care:  Pain management.

## 2022-07-23 NOTE — PROGRESS NOTES
Chief Complaint: Epigastric discomfort      Subjective:  Mr. Liss Pleitez is a 52y.o. year old * male admitted for upper mid abdominal pain radiating to back. Today his LFT's are elevated suggestive of CBD stone. Review of Systems:   Constitutional:  no fever, no chills,  no sweats, No weakness, No fatigue, No decreased activity. Respiratory: No shortness of breath, No cough, No sputum production, No hemoptysis, No wheezing, No cyanosis. Cardiovascular: No chest pain, No palpitations, No bradycardia, No tachycardia, No peripheral edema, No syncope. Gastrointestinal: No nausea, No vomiting, No diarrhea, No constipation, No heartburn,  abdominal pain. Genitourinary: No dysuria, No hematuria, No change in urine stream, No urethral discharge, No lesions. Hematology/Lymphatics: No bruising tendency, No bleeding tendency, No petechiae, No swollen lymph glands. Endocrine: No excessive thirst, No polyuria, No cold intolerance, No heat intolerance, No excessive hunger. Musculoskeletal: No back pain, No neck pain, No joint pain, No muscle pain, No claudication, No decreased range of motion, No trauma. Integumentary: No rash, No pruritus, No abrasions. Neurologic: Alert and oriented X4, No abnormal balance, No headache, No confusion, No numbness, No tingling. Psychiatric: No anxiety, No depression, No mookie. Physical Exam:     Vitals & Measurements:     Wt Readings from Last 3 Encounters:   07/22/22 103.9 kg (229 lb)   07/21/22 103.9 kg (229 lb)   07/20/22 103.9 kg (229 lb)     Temp Readings from Last 3 Encounters:   07/23/22 98.3 °F (36.8 °C)   07/21/22 98.5 °F (36.9 °C)   07/20/22 97.6 °F (36.4 °C)     BP Readings from Last 3 Encounters:   07/23/22 127/70   07/22/22 (!) 163/93   07/21/22 (!) 152/100     Pulse Readings from Last 3 Encounters:   07/23/22 88   07/22/22 78   07/21/22 84      Ht Readings from Last 3 Encounters:   07/22/22 5' 11\" (1.803 m)   07/21/22 5' 11\" (1.803 m)   07/20/22 5' 11\" (1.803 m)      Date 07/22/22 0700 - 07/23/22 0659 07/23/22 0700 - 07/24/22 0659   Shift 0700-1859 1900-0659 24 Hour Total 0700-1859 1900-0659 24 Hour Total   INTAKE   P.O.  0 0        P. O.  0 0      I. V.(mL/kg/hr)  900(0.7) 900(0.4)        Volume (0.9% sodium chloride infusion)  900 900      Shift Total(mL/kg)  900(8.7) 900(8.7)      OUTPUT   Urine(mL/kg/hr)           Urine Occurrence(s)  2 x 2 x      Stool           Stool Occurrence(s)  0 x 0 x      Shift Total(mL/kg)         NET  900 900      Weight (kg) 103.9 103.9 103.9 103.9 103.9 103.9       General: well appearing, no acute distress  Head: Normal  Face: Nornal  HEENT: atraumatic, PERRL, mildly icteric, moist mucosa, normal pharynx, normal tonsils and adenoids, normal tongue, no fluid in sinuses  Neck: Trachea midline, no carotid bruit, no masses  Chest: Normal.  Respiratory: normal chest wall expansion, CTA B, no r/r/w, no rubs  Cardiovascular: RRR, no m/r/g, Normal S1 and S2  Abdomen: Soft, mild epigastric tenderness,  non-distended, normal bowel sounds in all quadrants, no hepatosplenomegaly, no tympany. Incision scar: none  Genitourinary: No inguinal hernia, normal external gentalia, Testis & scrotum normal, no renal angle tenderness  Rectal: deferred  Musculoskeletal: Normal ROM in upper and lower extremities, No joint swelling. Integumentary: Warm, dry, and pink, with no rash, purpura, or petechia  Heme/Lymph: No lymphadenopathy, no bruises  Neurological: Cranial Nerves II-XII grossly intact, No gross sensory or motor deficit.   Psychiatric: Cooperative with normal mood, affect, and cognition    Laboratory Values:   Recent Results (from the past 24 hour(s))   CBC WITH AUTOMATED DIFF    Collection Time: 07/23/22  6:15 AM   Result Value Ref Range    WBC 8.1 4.1 - 11.1 K/uL    RBC 4.26 4.10 - 5.70 M/uL    HGB 13.3 12.1 - 17.0 g/dL    HCT 38.3 36.6 - 50.3 %    MCV 89.9 80.0 - 99.0 FL    MCH 31.2 26.0 - 34.0 PG    MCHC 34.7 30.0 - 36.5 g/dL    RDW 11.7 11.5 - 14.5 %    PLATELET 190 525 - 007 K/uL    MPV 9.3 8.9 - 12.9 FL    NRBC 0.0 0.0  WBC    ABSOLUTE NRBC 0.00 0.00 - 0.01 K/uL    NEUTROPHILS 73 32 - 75 %    LYMPHOCYTES 15 12 - 49 %    MONOCYTES 9 5 - 13 %    EOSINOPHILS 2 0 - 7 %    BASOPHILS 1 0 - 1 %    IMMATURE GRANULOCYTES 0 0 - 0.5 %    ABS. NEUTROPHILS 5.9 1.8 - 8.0 K/UL    ABS. LYMPHOCYTES 1.2 0.8 - 3.5 K/UL    ABS. MONOCYTES 0.7 0.0 - 1.0 K/UL    ABS. EOSINOPHILS 0.2 0.0 - 0.4 K/UL    ABS. BASOPHILS 0.1 0.0 - 0.1 K/UL    ABS. IMM. GRANS. 0.0 0.00 - 0.04 K/UL    DF AUTOMATED     HEPATIC FUNCTION PANEL    Collection Time: 07/23/22  6:15 AM   Result Value Ref Range    Protein, total 6.6 6.4 - 8.2 g/dL    Albumin 3.3 (L) 3.5 - 5.0 g/dL    Globulin 3.3 2.0 - 4.0 g/dL    A-G Ratio 1.0 (L) 1.1 - 2.2      Bilirubin, total 2.6 (H) 0.2 - 1.0 mg/dL    Bilirubin, direct 0.4 (H) 0.0 - 0.2 mg/dL    Alk. phosphatase 101 45 - 117 U/L    AST (SGOT) 41 (H) 15 - 37 U/L    ALT (SGPT) 47 12 - 78 U/L         No orders to display       Assessment:  Problem List Items Addressed This Visit          Digestive    Cholecystitis - Primary    CBD stone    Plan:    Admission  Diet: NPO  IV fluids  SCD  IS  Pain medications  Antibiotics  Nausea medication  Unable to MRCPP since the machine is broken and will not be available for several days. Consult: GI for an ERCP  Procedure/Surgery: Pending GI eval & ERCP  12. Plan discussed with patient and family and answered all their questions. Thank you for allowing me to participate in the care of this patient.

## 2022-07-23 NOTE — PROGRESS NOTES
Problem: Pain  Goal: *Control of Pain  Outcome: Progressing Towards Goal  Goal: *PALLIATIVE CARE:  Alleviation of Pain  Outcome: Progressing Towards Goal     Problem: Falls - Risk of  Goal: *Absence of Falls  Description: Document Jay Fall Risk and appropriate interventions in the flowsheet.   Outcome: Progressing Towards Goal  Note: Fall Risk Interventions:            Medication Interventions: Teach patient to arise slowly                   Problem: Patient Education: Go to Patient Education Activity  Goal: Patient/Family Education  Outcome: Progressing Towards Goal

## 2022-07-24 ENCOUNTER — APPOINTMENT (OUTPATIENT)
Dept: GENERAL RADIOLOGY | Age: 47
DRG: 414 | End: 2022-07-24
Attending: SURGERY
Payer: COMMERCIAL

## 2022-07-24 LAB
ALBUMIN SERPL-MCNC: 3.2 G/DL (ref 3.5–5)
ALBUMIN/GLOB SERPL: 0.9 {RATIO} (ref 1.1–2.2)
ALP SERPL-CCNC: 172 U/L (ref 45–117)
ALT SERPL-CCNC: 67 U/L (ref 12–78)
AST SERPL W P-5'-P-CCNC: 48 U/L (ref 15–37)
BASOPHILS # BLD: 0 K/UL (ref 0–0.1)
BASOPHILS # BLD: 0 K/UL (ref 0–0.1)
BASOPHILS NFR BLD: 0 % (ref 0–1)
BASOPHILS NFR BLD: 0 % (ref 0–1)
BILIRUB DIRECT SERPL-MCNC: 0.3 MG/DL (ref 0–0.2)
BILIRUB SERPL-MCNC: 1.4 MG/DL (ref 0.2–1)
DIFFERENTIAL METHOD BLD: ABNORMAL
DIFFERENTIAL METHOD BLD: NORMAL
EOSINOPHIL # BLD: 0 K/UL (ref 0–0.4)
EOSINOPHIL # BLD: 0 K/UL (ref 0–0.4)
EOSINOPHIL NFR BLD: 0 % (ref 0–7)
EOSINOPHIL NFR BLD: 0 % (ref 0–7)
ERYTHROCYTE [DISTWIDTH] IN BLOOD BY AUTOMATED COUNT: 11.7 % (ref 11.5–14.5)
ERYTHROCYTE [DISTWIDTH] IN BLOOD BY AUTOMATED COUNT: 12.1 % (ref 11.5–14.5)
GLOBULIN SER CALC-MCNC: 3.4 G/DL (ref 2–4)
HCT VFR BLD AUTO: 33.8 % (ref 36.6–50.3)
HCT VFR BLD AUTO: 37.1 % (ref 36.6–50.3)
HGB BLD-MCNC: 11.6 G/DL (ref 12.1–17)
HGB BLD-MCNC: 12.8 G/DL (ref 12.1–17)
IMM GRANULOCYTES # BLD AUTO: 0 K/UL (ref 0–0.04)
IMM GRANULOCYTES # BLD AUTO: 0 K/UL (ref 0–0.04)
IMM GRANULOCYTES NFR BLD AUTO: 0 % (ref 0–0.5)
IMM GRANULOCYTES NFR BLD AUTO: 1 % (ref 0–0.5)
LIPASE SERPL-CCNC: 238 U/L (ref 73–393)
LYMPHOCYTES # BLD: 0.5 K/UL (ref 0.8–3.5)
LYMPHOCYTES # BLD: 1.1 K/UL (ref 0.8–3.5)
LYMPHOCYTES NFR BLD: 16 % (ref 12–49)
LYMPHOCYTES NFR BLD: 5 % (ref 12–49)
MCH RBC QN AUTO: 30.8 PG (ref 26–34)
MCH RBC QN AUTO: 31.4 PG (ref 26–34)
MCHC RBC AUTO-ENTMCNC: 34.3 G/DL (ref 30–36.5)
MCHC RBC AUTO-ENTMCNC: 34.5 G/DL (ref 30–36.5)
MCV RBC AUTO: 89.2 FL (ref 80–99)
MCV RBC AUTO: 91.4 FL (ref 80–99)
MONOCYTES # BLD: 0.8 K/UL (ref 0–1)
MONOCYTES # BLD: 0.8 K/UL (ref 0–1)
MONOCYTES NFR BLD: 12 % (ref 5–13)
MONOCYTES NFR BLD: 9 % (ref 5–13)
NEUTS SEG # BLD: 4.9 K/UL (ref 1.8–8)
NEUTS SEG # BLD: 7.6 K/UL (ref 1.8–8)
NEUTS SEG NFR BLD: 72 % (ref 32–75)
NEUTS SEG NFR BLD: 85 % (ref 32–75)
NRBC # BLD: 0 K/UL (ref 0–0.01)
NRBC # BLD: 0 K/UL (ref 0–0.01)
NRBC BLD-RTO: 0 PER 100 WBC
NRBC BLD-RTO: 0 PER 100 WBC
PLATELET # BLD AUTO: 241 K/UL (ref 150–400)
PLATELET # BLD AUTO: 268 K/UL (ref 150–400)
PMV BLD AUTO: 9.1 FL (ref 8.9–12.9)
PMV BLD AUTO: 9.2 FL (ref 8.9–12.9)
PROT SERPL-MCNC: 6.6 G/DL (ref 6.4–8.2)
RBC # BLD AUTO: 3.7 M/UL (ref 4.1–5.7)
RBC # BLD AUTO: 4.16 M/UL (ref 4.1–5.7)
WBC # BLD AUTO: 6.9 K/UL (ref 4.1–11.1)
WBC # BLD AUTO: 8.9 K/UL (ref 4.1–11.1)

## 2022-07-24 PROCEDURE — 77030018706 HC CORD MPLR COVD -A: Performed by: SURGERY

## 2022-07-24 PROCEDURE — 0FT40ZZ RESECTION OF GALLBLADDER, OPEN APPROACH: ICD-10-PCS | Performed by: SURGERY

## 2022-07-24 PROCEDURE — 76060000040 HC ANESTHESIA 4.5 TO 5 HR: Performed by: SURGERY

## 2022-07-24 PROCEDURE — 74011250636 HC RX REV CODE- 250/636: Performed by: NURSE ANESTHETIST, CERTIFIED REGISTERED

## 2022-07-24 PROCEDURE — P9045 ALBUMIN (HUMAN), 5%, 250 ML: HCPCS | Performed by: NURSE ANESTHETIST, CERTIFIED REGISTERED

## 2022-07-24 PROCEDURE — 77030009851 HC PCH RTVR ENDOSC AMR -B: Performed by: SURGERY

## 2022-07-24 PROCEDURE — 36415 COLL VENOUS BLD VENIPUNCTURE: CPT

## 2022-07-24 PROCEDURE — 77030010513 HC APPL CLP LIG J&J -C: Performed by: SURGERY

## 2022-07-24 PROCEDURE — 77030012935 HC DRSG AQUACEL BMS -B: Performed by: SURGERY

## 2022-07-24 PROCEDURE — 74011000258 HC RX REV CODE- 258: Performed by: SURGERY

## 2022-07-24 PROCEDURE — 76210000016 HC OR PH I REC 1 TO 1.5 HR: Performed by: SURGERY

## 2022-07-24 PROCEDURE — 77030018842 HC SOL IRR SOD CL 9% BAXT -A: Performed by: SURGERY

## 2022-07-24 PROCEDURE — 74011250636 HC RX REV CODE- 250/636: Performed by: SURGERY

## 2022-07-24 PROCEDURE — 77030031139 HC SUT VCRL2 J&J -A: Performed by: SURGERY

## 2022-07-24 PROCEDURE — 83690 ASSAY OF LIPASE: CPT

## 2022-07-24 PROCEDURE — 85025 COMPLETE CBC W/AUTO DIFF WBC: CPT

## 2022-07-24 PROCEDURE — 77030018684: Performed by: SURGERY

## 2022-07-24 PROCEDURE — 77030008756 HC TU IRR SUC STRY -B: Performed by: SURGERY

## 2022-07-24 PROCEDURE — 0FJ44ZZ INSPECTION OF GALLBLADDER, PERCUTANEOUS ENDOSCOPIC APPROACH: ICD-10-PCS | Performed by: SURGERY

## 2022-07-24 PROCEDURE — 77030040361 HC SLV COMPR DVT MDII -B: Performed by: SURGERY

## 2022-07-24 PROCEDURE — 77030009403 HC ELECTRD ENDO MEGA -B: Performed by: SURGERY

## 2022-07-24 PROCEDURE — 77030008606 HC TRCR ENDOSC KII AMR -B: Performed by: SURGERY

## 2022-07-24 PROCEDURE — 77030002933 HC SUT MCRYL J&J -A: Performed by: SURGERY

## 2022-07-24 PROCEDURE — 74011000250 HC RX REV CODE- 250: Performed by: SURGERY

## 2022-07-24 PROCEDURE — 80076 HEPATIC FUNCTION PANEL: CPT

## 2022-07-24 PROCEDURE — 77030032060 HC PWDR HEMSTAT ARISTA ASRB 3GM BARD -C: Performed by: SURGERY

## 2022-07-24 PROCEDURE — 77030016151 HC PROTCTR LNS DFOG COVD -B: Performed by: SURGERY

## 2022-07-24 PROCEDURE — 74011000250 HC RX REV CODE- 250: Performed by: NURSE ANESTHETIST, CERTIFIED REGISTERED

## 2022-07-24 PROCEDURE — 96375 TX/PRO/DX INJ NEW DRUG ADDON: CPT

## 2022-07-24 PROCEDURE — 2709999900 HC NON-CHARGEABLE SUPPLY: Performed by: SURGERY

## 2022-07-24 PROCEDURE — 77030018813 HC SCIS LAPSCP EPIX DISP AMR -B: Performed by: SURGERY

## 2022-07-24 PROCEDURE — 77030010507 HC ADH SKN DERMBND J&J -B: Performed by: SURGERY

## 2022-07-24 PROCEDURE — 88304 TISSUE EXAM BY PATHOLOGIST: CPT

## 2022-07-24 PROCEDURE — 76010000136 HC OR TIME 4.5 TO 5 HR: Performed by: SURGERY

## 2022-07-24 PROCEDURE — 74011250636 HC RX REV CODE- 250/636: Performed by: ANESTHESIOLOGY

## 2022-07-24 PROCEDURE — 71045 X-RAY EXAM CHEST 1 VIEW: CPT

## 2022-07-24 PROCEDURE — G0378 HOSPITAL OBSERVATION PER HR: HCPCS

## 2022-07-24 PROCEDURE — 77030011264 HC ELECTRD BLD EXT COVD -A: Performed by: SURGERY

## 2022-07-24 PROCEDURE — 96376 TX/PRO/DX INJ SAME DRUG ADON: CPT

## 2022-07-24 RX ORDER — MIDAZOLAM HYDROCHLORIDE 1 MG/ML
INJECTION, SOLUTION INTRAMUSCULAR; INTRAVENOUS AS NEEDED
Status: DISCONTINUED | OUTPATIENT
Start: 2022-07-24 | End: 2022-07-24 | Stop reason: HOSPADM

## 2022-07-24 RX ORDER — DEXAMETHASONE SODIUM PHOSPHATE 4 MG/ML
INJECTION, SOLUTION INTRA-ARTICULAR; INTRALESIONAL; INTRAMUSCULAR; INTRAVENOUS; SOFT TISSUE AS NEEDED
Status: DISCONTINUED | OUTPATIENT
Start: 2022-07-24 | End: 2022-07-24 | Stop reason: HOSPADM

## 2022-07-24 RX ORDER — HYDROMORPHONE HYDROCHLORIDE 2 MG/ML
2 INJECTION, SOLUTION INTRAMUSCULAR; INTRAVENOUS; SUBCUTANEOUS
Status: DISCONTINUED | OUTPATIENT
Start: 2022-07-24 | End: 2022-07-25

## 2022-07-24 RX ORDER — DEXMEDETOMIDINE HYDROCHLORIDE 100 UG/ML
INJECTION, SOLUTION INTRAVENOUS AS NEEDED
Status: DISCONTINUED | OUTPATIENT
Start: 2022-07-24 | End: 2022-07-24 | Stop reason: HOSPADM

## 2022-07-24 RX ORDER — LIDOCAINE HYDROCHLORIDE 10 MG/ML
0.1 INJECTION, SOLUTION EPIDURAL; INFILTRATION; INTRACAUDAL; PERINEURAL AS NEEDED
Status: CANCELLED | OUTPATIENT
Start: 2022-07-24

## 2022-07-24 RX ORDER — MIDAZOLAM HYDROCHLORIDE 1 MG/ML
0.5 INJECTION, SOLUTION INTRAMUSCULAR; INTRAVENOUS
Status: DISCONTINUED | OUTPATIENT
Start: 2022-07-24 | End: 2022-07-24 | Stop reason: HOSPADM

## 2022-07-24 RX ORDER — ROCURONIUM BROMIDE 10 MG/ML
INJECTION, SOLUTION INTRAVENOUS AS NEEDED
Status: DISCONTINUED | OUTPATIENT
Start: 2022-07-24 | End: 2022-07-24 | Stop reason: HOSPADM

## 2022-07-24 RX ORDER — ONDANSETRON 2 MG/ML
INJECTION INTRAMUSCULAR; INTRAVENOUS AS NEEDED
Status: DISCONTINUED | OUTPATIENT
Start: 2022-07-24 | End: 2022-07-24 | Stop reason: HOSPADM

## 2022-07-24 RX ORDER — SODIUM CHLORIDE 0.9 % (FLUSH) 0.9 %
5-40 SYRINGE (ML) INJECTION AS NEEDED
Status: CANCELLED | OUTPATIENT
Start: 2022-07-24

## 2022-07-24 RX ORDER — ONDANSETRON 2 MG/ML
4 INJECTION INTRAMUSCULAR; INTRAVENOUS AS NEEDED
Status: DISCONTINUED | OUTPATIENT
Start: 2022-07-24 | End: 2022-07-24 | Stop reason: HOSPADM

## 2022-07-24 RX ORDER — HYDROMORPHONE HYDROCHLORIDE 2 MG/1
1 TABLET ORAL
Status: DISCONTINUED | OUTPATIENT
Start: 2022-07-24 | End: 2022-07-24 | Stop reason: HOSPADM

## 2022-07-24 RX ORDER — HYDROMORPHONE HYDROCHLORIDE 1 MG/ML
INJECTION, SOLUTION INTRAMUSCULAR; INTRAVENOUS; SUBCUTANEOUS AS NEEDED
Status: DISCONTINUED | OUTPATIENT
Start: 2022-07-24 | End: 2022-07-24 | Stop reason: HOSPADM

## 2022-07-24 RX ORDER — CEFAZOLIN SODIUM 1 G/3ML
INJECTION, POWDER, FOR SOLUTION INTRAMUSCULAR; INTRAVENOUS AS NEEDED
Status: DISCONTINUED | OUTPATIENT
Start: 2022-07-24 | End: 2022-07-24 | Stop reason: HOSPADM

## 2022-07-24 RX ORDER — SODIUM CHLORIDE 0.9 % (FLUSH) 0.9 %
5-40 SYRINGE (ML) INJECTION AS NEEDED
Status: DISCONTINUED | OUTPATIENT
Start: 2022-07-24 | End: 2022-07-24 | Stop reason: HOSPADM

## 2022-07-24 RX ORDER — SODIUM CHLORIDE, SODIUM LACTATE, POTASSIUM CHLORIDE, CALCIUM CHLORIDE 600; 310; 30; 20 MG/100ML; MG/100ML; MG/100ML; MG/100ML
100 INJECTION, SOLUTION INTRAVENOUS CONTINUOUS
Status: DISCONTINUED | OUTPATIENT
Start: 2022-07-24 | End: 2022-07-28 | Stop reason: HOSPADM

## 2022-07-24 RX ORDER — OXYCODONE AND ACETAMINOPHEN 5; 325 MG/1; MG/1
1 TABLET ORAL
Status: DISCONTINUED | OUTPATIENT
Start: 2022-07-24 | End: 2022-07-24 | Stop reason: HOSPADM

## 2022-07-24 RX ORDER — HYDROMORPHONE HYDROCHLORIDE 1 MG/ML
0.5 INJECTION, SOLUTION INTRAMUSCULAR; INTRAVENOUS; SUBCUTANEOUS
Status: DISCONTINUED | OUTPATIENT
Start: 2022-07-24 | End: 2022-07-24 | Stop reason: HOSPADM

## 2022-07-24 RX ORDER — SODIUM CHLORIDE, SODIUM LACTATE, POTASSIUM CHLORIDE, CALCIUM CHLORIDE 600; 310; 30; 20 MG/100ML; MG/100ML; MG/100ML; MG/100ML
INJECTION, SOLUTION INTRAVENOUS
Status: DISCONTINUED | OUTPATIENT
Start: 2022-07-24 | End: 2022-07-24 | Stop reason: HOSPADM

## 2022-07-24 RX ORDER — MORPHINE SULFATE 10 MG/ML
INJECTION, SOLUTION INTRAMUSCULAR; INTRAVENOUS AS NEEDED
Status: DISCONTINUED | OUTPATIENT
Start: 2022-07-24 | End: 2022-07-24 | Stop reason: HOSPADM

## 2022-07-24 RX ORDER — FENTANYL CITRATE 50 UG/ML
50 INJECTION, SOLUTION INTRAMUSCULAR; INTRAVENOUS AS NEEDED
Status: CANCELLED | OUTPATIENT
Start: 2022-07-24

## 2022-07-24 RX ORDER — NORETHINDRONE AND ETHINYL ESTRADIOL 0.5-0.035
5 KIT ORAL AS NEEDED
Status: DISCONTINUED | OUTPATIENT
Start: 2022-07-24 | End: 2022-07-24 | Stop reason: HOSPADM

## 2022-07-24 RX ORDER — HYDRALAZINE HYDROCHLORIDE 20 MG/ML
10 INJECTION INTRAMUSCULAR; INTRAVENOUS
Status: DISCONTINUED | OUTPATIENT
Start: 2022-07-24 | End: 2022-07-24 | Stop reason: HOSPADM

## 2022-07-24 RX ORDER — METOPROLOL TARTRATE 5 MG/5ML
2.5 INJECTION INTRAVENOUS
Status: DISCONTINUED | OUTPATIENT
Start: 2022-07-24 | End: 2022-07-24 | Stop reason: HOSPADM

## 2022-07-24 RX ORDER — OXYCODONE HYDROCHLORIDE 5 MG/1
5 TABLET ORAL
Status: DISCONTINUED | OUTPATIENT
Start: 2022-07-24 | End: 2022-07-24 | Stop reason: HOSPADM

## 2022-07-24 RX ORDER — MORPHINE SULFATE 10 MG/ML
2 INJECTION, SOLUTION INTRAMUSCULAR; INTRAVENOUS
Status: DISCONTINUED | OUTPATIENT
Start: 2022-07-24 | End: 2022-07-24 | Stop reason: HOSPADM

## 2022-07-24 RX ORDER — BUPIVACAINE HYDROCHLORIDE AND EPINEPHRINE 2.5; 5 MG/ML; UG/ML
INJECTION, SOLUTION INFILTRATION; PERINEURAL AS NEEDED
Status: DISCONTINUED | OUTPATIENT
Start: 2022-07-24 | End: 2022-07-24 | Stop reason: HOSPADM

## 2022-07-24 RX ORDER — PROPOFOL 10 MG/ML
INJECTION, EMULSION INTRAVENOUS AS NEEDED
Status: DISCONTINUED | OUTPATIENT
Start: 2022-07-24 | End: 2022-07-24 | Stop reason: HOSPADM

## 2022-07-24 RX ORDER — LABETALOL HCL 20 MG/4 ML
5 SYRINGE (ML) INTRAVENOUS
Status: DISCONTINUED | OUTPATIENT
Start: 2022-07-24 | End: 2022-07-24 | Stop reason: HOSPADM

## 2022-07-24 RX ORDER — OXYCODONE AND ACETAMINOPHEN 5; 325 MG/1; MG/1
1 TABLET ORAL AS NEEDED
Status: DISCONTINUED | OUTPATIENT
Start: 2022-07-24 | End: 2022-07-24 | Stop reason: HOSPADM

## 2022-07-24 RX ORDER — SUCCINYLCHOLINE CHLORIDE 20 MG/ML
INJECTION INTRAMUSCULAR; INTRAVENOUS AS NEEDED
Status: DISCONTINUED | OUTPATIENT
Start: 2022-07-24 | End: 2022-07-24 | Stop reason: HOSPADM

## 2022-07-24 RX ORDER — FENTANYL CITRATE 50 UG/ML
50 INJECTION, SOLUTION INTRAMUSCULAR; INTRAVENOUS
Status: DISCONTINUED | OUTPATIENT
Start: 2022-07-24 | End: 2022-07-24 | Stop reason: HOSPADM

## 2022-07-24 RX ORDER — MORPHINE SULFATE 15 MG/1
15 TABLET ORAL
Status: DISCONTINUED | OUTPATIENT
Start: 2022-07-24 | End: 2022-07-24 | Stop reason: HOSPADM

## 2022-07-24 RX ORDER — MIDAZOLAM HYDROCHLORIDE 1 MG/ML
1 INJECTION, SOLUTION INTRAMUSCULAR; INTRAVENOUS AS NEEDED
Status: CANCELLED | OUTPATIENT
Start: 2022-07-24

## 2022-07-24 RX ORDER — SODIUM CHLORIDE 0.9 % (FLUSH) 0.9 %
5-40 SYRINGE (ML) INJECTION EVERY 8 HOURS
Status: DISCONTINUED | OUTPATIENT
Start: 2022-07-24 | End: 2022-07-24 | Stop reason: HOSPADM

## 2022-07-24 RX ORDER — HYDROCODONE BITARTRATE AND ACETAMINOPHEN 5; 325 MG/1; MG/1
1 TABLET ORAL
Status: DISCONTINUED | OUTPATIENT
Start: 2022-07-24 | End: 2022-07-24 | Stop reason: HOSPADM

## 2022-07-24 RX ORDER — FENTANYL CITRATE 50 UG/ML
INJECTION, SOLUTION INTRAMUSCULAR; INTRAVENOUS AS NEEDED
Status: DISCONTINUED | OUTPATIENT
Start: 2022-07-24 | End: 2022-07-24 | Stop reason: HOSPADM

## 2022-07-24 RX ORDER — SODIUM CHLORIDE 0.9 % (FLUSH) 0.9 %
5-40 SYRINGE (ML) INJECTION EVERY 8 HOURS
Status: CANCELLED | OUTPATIENT
Start: 2022-07-24

## 2022-07-24 RX ORDER — ALBUMIN HUMAN 50 G/1000ML
SOLUTION INTRAVENOUS AS NEEDED
Status: DISCONTINUED | OUTPATIENT
Start: 2022-07-24 | End: 2022-07-24 | Stop reason: HOSPADM

## 2022-07-24 RX ORDER — LIDOCAINE HYDROCHLORIDE 20 MG/ML
INJECTION, SOLUTION EPIDURAL; INFILTRATION; INTRACAUDAL; PERINEURAL AS NEEDED
Status: DISCONTINUED | OUTPATIENT
Start: 2022-07-24 | End: 2022-07-24 | Stop reason: HOSPADM

## 2022-07-24 RX ORDER — DIPHENHYDRAMINE HYDROCHLORIDE 50 MG/ML
12.5 INJECTION, SOLUTION INTRAMUSCULAR; INTRAVENOUS AS NEEDED
Status: DISCONTINUED | OUTPATIENT
Start: 2022-07-24 | End: 2022-07-24 | Stop reason: HOSPADM

## 2022-07-24 RX ADMIN — PIPERACILLIN AND TAZOBACTAM 3.38 G: 3; .375 INJECTION, POWDER, FOR SOLUTION INTRAVENOUS at 23:40

## 2022-07-24 RX ADMIN — ROCURONIUM BROMIDE 10 MG: 50 INJECTION, SOLUTION INTRAVENOUS at 12:34

## 2022-07-24 RX ADMIN — ROCURONIUM BROMIDE 20 MG: 50 INJECTION, SOLUTION INTRAVENOUS at 10:07

## 2022-07-24 RX ADMIN — SODIUM CHLORIDE, POTASSIUM CHLORIDE, SODIUM LACTATE AND CALCIUM CHLORIDE 500 ML: 600; 310; 30; 20 INJECTION, SOLUTION INTRAVENOUS at 22:00

## 2022-07-24 RX ADMIN — HYDROMORPHONE HYDROCHLORIDE 0.5 MG: 1 INJECTION, SOLUTION INTRAMUSCULAR; INTRAVENOUS; SUBCUTANEOUS at 13:28

## 2022-07-24 RX ADMIN — LIDOCAINE HYDROCHLORIDE 100 MG: 20 INJECTION, SOLUTION EPIDURAL; INFILTRATION; INTRACAUDAL; PERINEURAL at 08:39

## 2022-07-24 RX ADMIN — PHENYLEPHRINE HYDROCHLORIDE 200 MCG: 10 INJECTION INTRAVENOUS at 09:02

## 2022-07-24 RX ADMIN — DEXMEDETOMIDINE HYDROCHLORIDE 20 MCG: 100 INJECTION, SOLUTION INTRAVENOUS at 11:03

## 2022-07-24 RX ADMIN — ROCURONIUM BROMIDE 20 MG: 50 INJECTION, SOLUTION INTRAVENOUS at 09:11

## 2022-07-24 RX ADMIN — ONDANSETRON 4 MG: 2 INJECTION INTRAMUSCULAR; INTRAVENOUS at 08:56

## 2022-07-24 RX ADMIN — PHENYLEPHRINE HYDROCHLORIDE 200 MCG: 10 INJECTION INTRAVENOUS at 11:59

## 2022-07-24 RX ADMIN — PIPERACILLIN AND TAZOBACTAM 3.38 G: 3; .375 INJECTION, POWDER, LYOPHILIZED, FOR SOLUTION INTRAVENOUS at 04:21

## 2022-07-24 RX ADMIN — SODIUM CHLORIDE, PRESERVATIVE FREE 10 ML: 5 INJECTION INTRAVENOUS at 22:29

## 2022-07-24 RX ADMIN — FENTANYL CITRATE 100 MCG: 50 INJECTION, SOLUTION INTRAMUSCULAR; INTRAVENOUS at 08:39

## 2022-07-24 RX ADMIN — FENTANYL CITRATE 50 MCG: 50 INJECTION INTRAMUSCULAR; INTRAVENOUS at 14:11

## 2022-07-24 RX ADMIN — PHENYLEPHRINE HYDROCHLORIDE 200 MCG: 10 INJECTION INTRAVENOUS at 09:45

## 2022-07-24 RX ADMIN — SODIUM CHLORIDE, PRESERVATIVE FREE 10 ML: 5 INJECTION INTRAVENOUS at 05:24

## 2022-07-24 RX ADMIN — DEXAMETHASONE SODIUM PHOSPHATE 4 MG: 4 INJECTION, SOLUTION INTRA-ARTICULAR; INTRALESIONAL; INTRAMUSCULAR; INTRAVENOUS; SOFT TISSUE at 08:56

## 2022-07-24 RX ADMIN — HYDROMORPHONE HYDROCHLORIDE 0.5 MG: 1 INJECTION, SOLUTION INTRAMUSCULAR; INTRAVENOUS; SUBCUTANEOUS at 13:44

## 2022-07-24 RX ADMIN — ROCURONIUM BROMIDE 30 MG: 50 INJECTION, SOLUTION INTRAVENOUS at 10:46

## 2022-07-24 RX ADMIN — PIPERACILLIN AND TAZOBACTAM 3.38 G: 3; .375 INJECTION, POWDER, LYOPHILIZED, FOR SOLUTION INTRAVENOUS at 16:04

## 2022-07-24 RX ADMIN — HYDROMORPHONE HYDROCHLORIDE 2 MG: 2 INJECTION, SOLUTION INTRAMUSCULAR; INTRAVENOUS; SUBCUTANEOUS at 23:41

## 2022-07-24 RX ADMIN — ROCURONIUM BROMIDE 30 MG: 50 INJECTION, SOLUTION INTRAVENOUS at 08:52

## 2022-07-24 RX ADMIN — ALBUMIN (HUMAN) 12.5 G: 12.5 INJECTION, SOLUTION INTRAVENOUS at 12:10

## 2022-07-24 RX ADMIN — HYDROMORPHONE HYDROCHLORIDE 1 MG: 1 INJECTION, SOLUTION INTRAMUSCULAR; INTRAVENOUS; SUBCUTANEOUS at 08:35

## 2022-07-24 RX ADMIN — SUGAMMADEX 400 MG: 100 INJECTION, SOLUTION INTRAVENOUS at 12:46

## 2022-07-24 RX ADMIN — SUCCINYLCHOLINE CHLORIDE 180 MG: 20 INJECTION, SOLUTION INTRAMUSCULAR; INTRAVENOUS at 08:39

## 2022-07-24 RX ADMIN — CEFAZOLIN SODIUM 2 G: 1 INJECTION, POWDER, FOR SOLUTION INTRAMUSCULAR; INTRAVENOUS at 08:50

## 2022-07-24 RX ADMIN — CEFAZOLIN SODIUM 2 G: 1 INJECTION, POWDER, FOR SOLUTION INTRAMUSCULAR; INTRAVENOUS at 12:52

## 2022-07-24 RX ADMIN — MIDAZOLAM HYDROCHLORIDE 2 MG: 2 INJECTION, SOLUTION INTRAMUSCULAR; INTRAVENOUS at 08:32

## 2022-07-24 RX ADMIN — SODIUM CHLORIDE, PRESERVATIVE FREE 10 ML: 5 INJECTION INTRAVENOUS at 16:27

## 2022-07-24 RX ADMIN — PROPOFOL 200 MG: 10 INJECTION, EMULSION INTRAVENOUS at 08:39

## 2022-07-24 RX ADMIN — SODIUM CHLORIDE, POTASSIUM CHLORIDE, SODIUM LACTATE AND CALCIUM CHLORIDE: 600; 310; 30; 20 INJECTION, SOLUTION INTRAVENOUS at 08:32

## 2022-07-24 RX ADMIN — MORPHINE SULFATE 10 MG: 10 INJECTION, SOLUTION INTRAMUSCULAR; INTRAVENOUS at 11:22

## 2022-07-24 NOTE — ANESTHESIA POSTPROCEDURE EVALUATION
Procedure(s):  ENDOSCOPIC RETROGRADE CHOLANGIOPANCREATOGRAPHY (ERCP)  ENDOSCOPIC SPHINCTEROTOMY.     general    Anesthesia Post Evaluation      Multimodal analgesia: multimodal analgesia used between 6 hours prior to anesthesia start to PACU discharge  Patient location during evaluation: PACU  Patient participation: complete - patient participated  Level of consciousness: awake  Pain score: 0  Pain management: adequate  Airway patency: patent  Anesthetic complications: no  Cardiovascular status: acceptable  Respiratory status: acceptable  Hydration status: acceptable  Post anesthesia nausea and vomiting:  controlled  Final Post Anesthesia Temperature Assessment:  Normothermia (36.0-37.5 degrees C)      INITIAL Post-op Vital signs:   Vitals Value Taken Time   /77 07/24/22 0208   Temp 36.4 °C (97.6 °F) 07/24/22 0208   Pulse 100 07/24/22 0208   Resp 16 07/24/22 0208   SpO2 95 % 07/24/22 0208

## 2022-07-24 NOTE — OP NOTES
OPERATIVE NOTE    Patient: Matias Bolton MRN: [id number]     YOB: 1975  Age: 52 y.o. Sex: male      PREOPERATIVE DIAGNOSES:  Acute cholecystitis     POSTOPERATIVE DIAGNOSES:  Empyema of GB     PROCEDURE PERFORMED:  Laparoscopic procedure converted to Open Cholecystectomy with 19Fr drain placement  SURGEON:  Jackie Foreman MD     ASSISTANT:   Ms. Karina Bautista MsAntonia Jaxson Melton     ANESTHESIA:  General anesthesia/local anesthesia. ANESTHESIOLOGIST:  Dr. Sousa Dierks    CRNA:Antonia Eugenio    INDICATION:   Risks, benefits, and complications were discussed including bleeding, infection, possible DVT/PE, possible cardiac arrhythmia as well as myocardial infarction, possible stroke, possible sepsis, possible open, possible injury to bile duct, bowel, bladder, ureter, possible bile leak. The patient seemed to clearly understand and agreed with the plan. I answered all their questions to their satisfaction. PROCEDURE:  The patient was taken to the operating room. The patient was laid supine. The patient received prophylactic dose of antibiotic. The patient had TEDs and SCDs applied on both lower extremities. After intubation, a Birmingham catheter was placed under aseptic precaution. The abdomen was prepped and draped in the usual sterile fashion. Time-out was completed. Local anesthesia was infiltrated. Infraumbilical curvilinear incision was placed through which a 5 mm Optiview port was placed into pleural cath without any complication. Carbon dioxide pneumoperitoneum was insufflated to pressure of 15 mmHg. Under direct vision 3 additional 5 Rosa ports were placed #1 in the right flank area, #2 in the right upper quadrant, and #3 in the epigastric area. The 5 Rosa port in the umbilical site was switched to a 12 mm port. The gallbladder was distended with thick wall. We had to decompress the gallbladder. It contained a lot of pus. I was not able to drain the pus through a needle aspiration. So had to make us tiny fenestration of the fundus of the gallbladder in order to suck out all the pus from the gallbladder. This will also facilitate me to use a grasper to grasp the gallbladder and retract. The entire gallbladder was huge thick-walled making it extremely difficult to grasp the gallbladder. I could not clearly identify or visualize the anatomy around the infundibulum and the Calot's triangle. The site was draped with thick-walled omentum and fatty tissue. The anatomy was not clear. I tried to dissect carefully along the lateral wall of the gallbladder hoping that I could get an access to identify the cystic duct and cystic artery. But this was extremely difficult because I could not grasp the infundibulum of the gallbladder due to the thick-walled nature. So attempted as fundus down technique using a combination of Bovie cautery and LigaSure device. This was also extremely difficult because of the chronicity and inflammation and thick-walled nature of the gallbladder. I was able to dissect to some extent. After working for at least an hour I decided to convert into an open gallbladder technique. Subcostal incision was placed. Incision was deepened through subcutaneous tissue, external oblique aponeurosis and the muscle layers. The peritoneal cavity was entered without any complication. Troy retractors were placed. The gallbladder was grasped with a Judith clamp. The fundus down technique was continued. There were some bleeders in the gallbladder fossa that were cauterized. I was able to get all the way down along the gallbladder fossa up to the level of the infundibulum. However anteriorly I could not clearly identify the anatomy at the level of the infundibulum or its junction with the cystic duct or cystic vessel because of the thickened nature of the fatty tissue and inflammatory tissues around. This made it extremely unclear and unsafe for me to proceed any further.   So I decided to excise the fundus of the gallbladder and scoop out all the stones. Then I went and excised the remaining gallbladder all the way up to the level of the infundibulum scooped out all the stones and washed it off. I left a little cuff of gallbladder tissue still attached to the presumed area of cystic duct and cystic artery and closed this cuff with 2-0 Prolene stitch in a running fashion. There was some bleeding in the gallbladder fossa that was cauterized and stuffed with hemo static powder. I made sure the hemostasis was good. Then I went and placed a 23 Amharic drain that was brought through one of the port sites and placed in the gallbladder fossa. The drain was secured to the skin using 2-0 nylon stitches x2. Once we were happy with hemostasis we covered the area with omentum took out all the packing and close the wound in layers using #1 looped PDS from medial to lateral and from lateral to medial aspect. The subcutis tissues were closed using 2-0 Vicryl simple type stitches. Skin was stapled together. The port sites incisions were also stapled together. The umbilical site incision site fascia was closed using 0 Vicryl in the form of simple figure-of-eight stitch. Subcutaneous tissues were approximated with 3-0 Vicryl stitch. The skin was stapled together. Additional local anesthesia was infiltrated. Clean dry dressing was applied. The patient tolerated the procedure very well. There were no complications. Estimated blood loss was 200ml. The patient was extubated. Birmingham catheter was not removed and NG tube placed and transferred to the recovery room in stable condition. All counts were correct. COMPLICATIONS:  none. SPECIMENS REMOVED: GB     IMPLANTS: None     ESTIMATED BLOOD LOSS:  200ml. DRAIN: 19Fr    ALL COUNTS: Correct     Thank you for allowing me to participate in the care of this patient.     CC:                   Harriett Arreguin MD

## 2022-07-24 NOTE — PROGRESS NOTES
Problem: Pain  Goal: *Control of Pain  Outcome: Progressing Towards Goal  Goal: *PALLIATIVE CARE:  Alleviation of Pain  Outcome: Progressing Towards Goal     Problem: Patient Education: Go to Patient Education Activity  Goal: Patient/Family Education  Outcome: Progressing Towards Goal     Problem: Falls - Risk of  Goal: *Absence of Falls  Description: Document [place] Fall Risk and appropriate interventions in the flowsheet.   Outcome: Progressing Towards Goal  Note: Fall Risk Interventions:            Medication Interventions: Teach patient to arise slowly, Patient to call before getting OOB                   Problem: Patient Education: Go to Patient Education Activity  Goal: Patient/Family Education  Outcome: Progressing Towards Goal

## 2022-07-24 NOTE — ANESTHESIA PREPROCEDURE EVALUATION
Relevant Problems   No relevant active problems       Anesthetic History   No history of anesthetic complications            Review of Systems / Medical History  Patient summary reviewed, nursing notes reviewed and pertinent labs reviewed    Pulmonary  Within defined limits                 Neuro/Psych   Within defined limits           Cardiovascular  Within defined limits                     GI/Hepatic/Renal  Within defined limits              Endo/Other  Within defined limits           Other Findings            No past medical history on file. No past surgical history on file. Current Outpatient Medications   Medication Instructions    amoxicillin 500 mg, Oral, 3 TIMES DAILY    ondansetron (ZOFRAN ODT) 4 mg, Oral, EVERY 8 HOURS AS NEEDED    oxyCODONE-acetaminophen (Percocet) 5-325 mg per tablet 1 Tablet, Oral, EVERY 6 HOURS AS NEEDED       No current facility-administered medications for this visit. No current outpatient medications on file.      Facility-Administered Medications Ordered in Other Visits   Medication Dose Route Frequency    HYDROmorphone (DILAUDID) injection   IntraVENous PRN    midazolam (VERSED) injection   IntraVENous PRN    propofoL (DIPRIVAN) 10 mg/mL injection   IntraVENous PRN    succinylcholine (ANECTINE) injection   IntraVENous PRN    rocuronium injection   IntraVENous PRN    lactated Ringers infusion   IntraVENous CONTINUOUS    ceFAZolin (ANCEF) injection   IntraVENous PRN    fentaNYL citrate (PF) injection   IntraVENous PRN    lidocaine (PF) (XYLOCAINE) 20 mg/mL (2 %) injection   IntraVENous PRN    dexamethasone (DECADRON) 4 mg/mL injection   IntraVENous PRN    ondansetron (ZOFRAN) injection   IntraVENous PRN    PHENYLephrine 100 mcg/mL 10 mL syringe (ONE-STEP)   IntraVENous CONTINUOUS    bupivacaine-EPINEPHrine (SENSORCAINE) 0.25 %-1:200,000 infusion    PRN    sodium chloride (NS) flush 5-40 mL  5-40 mL IntraVENous Q8H    sodium chloride (NS) flush 5-40 mL 5-40 mL IntraVENous PRN    acetaminophen (TYLENOL) tablet 650 mg  650 mg Oral Q6H PRN    Or    acetaminophen (TYLENOL) suppository 650 mg  650 mg Rectal Q6H PRN    polyethylene glycol (MIRALAX) packet 17 g  17 g Oral DAILY PRN    ondansetron (ZOFRAN ODT) tablet 4 mg  4 mg Oral Q8H PRN    Or    ondansetron (ZOFRAN) injection 4 mg  4 mg IntraVENous Q6H PRN    enoxaparin (LOVENOX) injection 40 mg  40 mg SubCUTAneous DAILY    0.9% sodium chloride infusion  75 mL/hr IntraVENous CONTINUOUS    HYDROmorphone (DILAUDID) injection 1 mg  1 mg IntraVENous Q4H PRN    piperacillin-tazobactam (ZOSYN) 3.375 g in 0.9% sodium chloride (MBP/ADV) 100 mL MBP  3.375 g IntraVENous Q8H    *Please  patient's home medications from pharmacy at discharge*  1 Each Other PRIOR TO DISCHARGE       No data found.     Lab Results   Component Value Date/Time    WBC 6.9 07/24/2022 06:25 AM    HGB 12.8 07/24/2022 06:25 AM    HCT 37.1 07/24/2022 06:25 AM    PLATELET 778 56/20/4339 06:25 AM    MCV 89.2 07/24/2022 06:25 AM     Lab Results   Component Value Date/Time    Sodium 135 (L) 07/21/2022 08:23 PM    Potassium 3.7 07/21/2022 08:23 PM    Chloride 101 07/21/2022 08:23 PM    CO2 28 07/21/2022 08:23 PM    Anion gap 6 07/21/2022 08:23 PM    Glucose 99 07/21/2022 08:23 PM    BUN 11 07/21/2022 08:23 PM    Creatinine 1.21 07/21/2022 08:23 PM    BUN/Creatinine ratio 9 (L) 07/21/2022 08:23 PM    GFR est AA >60 07/21/2022 08:23 PM    GFR est non-AA >60 07/21/2022 08:23 PM    Calcium 9.3 07/21/2022 08:23 PM     No results found for: APTT, PTP, INR, INREXT, INREXT  Lab Results   Component Value Date/Time    Glucose 99 07/21/2022 08:23 PM     Physical Exam    Airway  Mallampati: I  TM Distance: 4 - 6 cm  Neck ROM: normal range of motion   Mouth opening: Normal     Cardiovascular    Rhythm: regular  Rate: normal         Dental  No notable dental hx       Pulmonary  Breath sounds clear to auscultation               Abdominal  GI exam deferred Other Findings            Anesthetic Plan    ASA: 2, emergent  Anesthesia type: general          Induction: Intravenous  Anesthetic plan and risks discussed with: Patient and Family

## 2022-07-24 NOTE — ANESTHESIA PREPROCEDURE EVALUATION
Relevant Problems   No relevant active problems       Anesthetic History   No history of anesthetic complications            Review of Systems / Medical History  Patient summary reviewed, nursing notes reviewed and pertinent labs reviewed    Pulmonary  Within defined limits                 Neuro/Psych   Within defined limits           Cardiovascular  Within defined limits                     GI/Hepatic/Renal  Within defined limits              Endo/Other  Within defined limits           Other Findings            History reviewed. No pertinent past medical history. No past surgical history on file.     Current Outpatient Medications   Medication Instructions    amoxicillin 500 mg, Oral, 3 TIMES DAILY    ondansetron (ZOFRAN ODT) 4 mg, Oral, EVERY 8 HOURS AS NEEDED    oxyCODONE-acetaminophen (Percocet) 5-325 mg per tablet 1 Tablet, Oral, EVERY 6 HOURS AS NEEDED       Current Facility-Administered Medications   Medication Dose Route Frequency    bupivacaine-EPINEPHrine (SENSORCAINE) 0.25 %-1:200,000 infusion    PRN    sodium chloride (NS) flush 5-40 mL  5-40 mL IntraVENous Q8H    sodium chloride (NS) flush 5-40 mL  5-40 mL IntraVENous PRN    acetaminophen (TYLENOL) tablet 650 mg  650 mg Oral Q6H PRN    Or    acetaminophen (TYLENOL) suppository 650 mg  650 mg Rectal Q6H PRN    polyethylene glycol (MIRALAX) packet 17 g  17 g Oral DAILY PRN    ondansetron (ZOFRAN ODT) tablet 4 mg  4 mg Oral Q8H PRN    Or    ondansetron (ZOFRAN) injection 4 mg  4 mg IntraVENous Q6H PRN    enoxaparin (LOVENOX) injection 40 mg  40 mg SubCUTAneous DAILY    0.9% sodium chloride infusion  75 mL/hr IntraVENous CONTINUOUS    HYDROmorphone (DILAUDID) injection 1 mg  1 mg IntraVENous Q4H PRN    piperacillin-tazobactam (ZOSYN) 3.375 g in 0.9% sodium chloride (MBP/ADV) 100 mL MBP  3.375 g IntraVENous Q8H    *Please  patient's home medications from pharmacy at discharge*  1 Each Other PRIOR TO DISCHARGE Facility-Administered Medications Ordered in Other Encounters   Medication Dose Route Frequency    HYDROmorphone (DILAUDID) injection   IntraVENous PRN    midazolam (VERSED) injection   IntraVENous PRN    propofoL (DIPRIVAN) 10 mg/mL injection   IntraVENous PRN    succinylcholine (ANECTINE) injection   IntraVENous PRN    rocuronium injection   IntraVENous PRN    lactated Ringers infusion   IntraVENous CONTINUOUS    ceFAZolin (ANCEF) injection   IntraVENous PRN    fentaNYL citrate (PF) injection   IntraVENous PRN    lidocaine (PF) (XYLOCAINE) 20 mg/mL (2 %) injection   IntraVENous PRN    dexamethasone (DECADRON) 4 mg/mL injection   IntraVENous PRN    ondansetron (ZOFRAN) injection   IntraVENous PRN    PHENYLephrine 100 mcg/mL 10 mL syringe (ONE-STEP)   IntraVENous CONTINUOUS       Patient Vitals for the past 24 hrs:   Temp Pulse Resp BP SpO2   07/24/22 0208 36.4 °C (97.6 °F) 100 16 118/77 95 %   07/23/22 2033 37.1 °C (98.8 °F) 100 16 130/75 96 %   07/23/22 1310 -- 99 15 (!) 132/98 95 %   07/23/22 1305 -- 100 22 (!) 140/101 91 %   07/23/22 1300 -- (!) 105 19 (!) 134/96 92 %   07/23/22 1258 36.2 °C (97.1 °F) (!) 104 13 (!) 144/99 93 %       Lab Results   Component Value Date/Time    WBC 6.9 07/24/2022 06:25 AM    HGB 12.8 07/24/2022 06:25 AM    HCT 37.1 07/24/2022 06:25 AM    PLATELET 380 76/74/3567 06:25 AM    MCV 89.2 07/24/2022 06:25 AM     Lab Results   Component Value Date/Time    Sodium 135 (L) 07/21/2022 08:23 PM    Potassium 3.7 07/21/2022 08:23 PM    Chloride 101 07/21/2022 08:23 PM    CO2 28 07/21/2022 08:23 PM    Anion gap 6 07/21/2022 08:23 PM    Glucose 99 07/21/2022 08:23 PM    BUN 11 07/21/2022 08:23 PM    Creatinine 1.21 07/21/2022 08:23 PM    BUN/Creatinine ratio 9 (L) 07/21/2022 08:23 PM    GFR est AA >60 07/21/2022 08:23 PM    GFR est non-AA >60 07/21/2022 08:23 PM    Calcium 9.3 07/21/2022 08:23 PM     No results found for: APTT, PTP, INR, INREXT  Lab Results   Component Value Date/Time    Glucose 99 07/21/2022 08:23 PM     Physical Exam    Airway  Mallampati: I  TM Distance: 4 - 6 cm  Neck ROM: normal range of motion   Mouth opening: Normal     Cardiovascular    Rhythm: regular  Rate: normal         Dental  No notable dental hx       Pulmonary  Breath sounds clear to auscultation               Abdominal  GI exam deferred       Other Findings            Anesthetic Plan    ASA: 2, emergent  Anesthesia type: general          Induction: Intravenous  Anesthetic plan and risks discussed with: Patient and Family

## 2022-07-24 NOTE — PROGRESS NOTES
Neuro/ mobility:  AAOX4. Aassist to BR after surgery    Nursing report:      Lap turned open dinorah performed today. Noted oxygen desat to high 80s when sleeping. NC on patient when he arrived back from surgery 4 lpm.  Weaned down to 3 l nc95%3 lnc    Educated patient on incentive spirometer with 10 x per hour. NGT in place. Draining green liquid. Low continuous suction    ISRRAEL drain in right side of abdomen with sero sanguinous fluid. [place] draining yellow urine. Patient arrived to unit with pain 9/10. Denies nausea. Continued NPO. Nursing Plan of Care:  Pain management. Drain management. Education.   Pulmonary toileting

## 2022-07-25 ENCOUNTER — APPOINTMENT (OUTPATIENT)
Dept: GENERAL RADIOLOGY | Age: 47
DRG: 414 | End: 2022-07-25
Attending: INTERNAL MEDICINE
Payer: COMMERCIAL

## 2022-07-25 LAB
ALBUMIN SERPL-MCNC: 3.1 G/DL (ref 3.5–5)
ALBUMIN/GLOB SERPL: 0.9 {RATIO} (ref 1.1–2.2)
ALP SERPL-CCNC: 122 U/L (ref 45–117)
ALT SERPL-CCNC: 98 U/L (ref 12–78)
AST SERPL W P-5'-P-CCNC: 133 U/L (ref 15–37)
BASOPHILS # BLD: 0 K/UL (ref 0–0.1)
BASOPHILS NFR BLD: 0 % (ref 0–1)
BILIRUB DIRECT SERPL-MCNC: 0.1 MG/DL (ref 0–0.2)
BILIRUB SERPL-MCNC: 0.6 MG/DL (ref 0.2–1)
BNP SERPL-MCNC: 478 PG/ML
DIFFERENTIAL METHOD BLD: ABNORMAL
EOSINOPHIL # BLD: 0 K/UL (ref 0–0.4)
EOSINOPHIL NFR BLD: 0 % (ref 0–7)
ERYTHROCYTE [DISTWIDTH] IN BLOOD BY AUTOMATED COUNT: 12.1 % (ref 11.5–14.5)
GLOBULIN SER CALC-MCNC: 3.6 G/DL (ref 2–4)
HCT VFR BLD AUTO: 35.8 % (ref 36.6–50.3)
HGB BLD-MCNC: 12 G/DL (ref 12.1–17)
IMM GRANULOCYTES # BLD AUTO: 0.1 K/UL (ref 0–0.04)
IMM GRANULOCYTES NFR BLD AUTO: 1 % (ref 0–0.5)
LYMPHOCYTES # BLD: 1.1 K/UL (ref 0.8–3.5)
LYMPHOCYTES NFR BLD: 7 % (ref 12–49)
MCH RBC QN AUTO: 31.4 PG (ref 26–34)
MCHC RBC AUTO-ENTMCNC: 33.5 G/DL (ref 30–36.5)
MCV RBC AUTO: 93.7 FL (ref 80–99)
MONOCYTES # BLD: 1.7 K/UL (ref 0–1)
MONOCYTES NFR BLD: 11 % (ref 5–13)
NEUTS SEG # BLD: 12.5 K/UL (ref 1.8–8)
NEUTS SEG NFR BLD: 81 % (ref 32–75)
NRBC # BLD: 0 K/UL (ref 0–0.01)
NRBC BLD-RTO: 0 PER 100 WBC
PLATELET # BLD AUTO: 263 K/UL (ref 150–400)
PMV BLD AUTO: 9.1 FL (ref 8.9–12.9)
PROT SERPL-MCNC: 6.7 G/DL (ref 6.4–8.2)
RBC # BLD AUTO: 3.82 M/UL (ref 4.1–5.7)
WBC # BLD AUTO: 15.4 K/UL (ref 4.1–11.1)

## 2022-07-25 PROCEDURE — 74011000250 HC RX REV CODE- 250: Performed by: SURGERY

## 2022-07-25 PROCEDURE — 74011250636 HC RX REV CODE- 250/636: Performed by: SURGERY

## 2022-07-25 PROCEDURE — G0378 HOSPITAL OBSERVATION PER HR: HCPCS

## 2022-07-25 PROCEDURE — 83880 ASSAY OF NATRIURETIC PEPTIDE: CPT

## 2022-07-25 PROCEDURE — 77010033678 HC OXYGEN DAILY

## 2022-07-25 PROCEDURE — 71045 X-RAY EXAM CHEST 1 VIEW: CPT

## 2022-07-25 PROCEDURE — 36415 COLL VENOUS BLD VENIPUNCTURE: CPT

## 2022-07-25 PROCEDURE — 85025 COMPLETE CBC W/AUTO DIFF WBC: CPT

## 2022-07-25 PROCEDURE — 80076 HEPATIC FUNCTION PANEL: CPT

## 2022-07-25 PROCEDURE — 74011000258 HC RX REV CODE- 258: Performed by: SURGERY

## 2022-07-25 PROCEDURE — 65270000029 HC RM PRIVATE

## 2022-07-25 RX ORDER — FENTANYL CITRATE 50 UG/ML
75 INJECTION, SOLUTION INTRAMUSCULAR; INTRAVENOUS
Status: DISCONTINUED | OUTPATIENT
Start: 2022-07-25 | End: 2022-07-26

## 2022-07-25 RX ORDER — KETOROLAC TROMETHAMINE 15 MG/ML
15 INJECTION, SOLUTION INTRAMUSCULAR; INTRAVENOUS EVERY 6 HOURS
Status: DISCONTINUED | OUTPATIENT
Start: 2022-07-25 | End: 2022-07-25

## 2022-07-25 RX ORDER — KETOROLAC TROMETHAMINE 15 MG/ML
15 INJECTION, SOLUTION INTRAMUSCULAR; INTRAVENOUS
Status: DISCONTINUED | OUTPATIENT
Start: 2022-07-25 | End: 2022-07-25 | Stop reason: SDUPTHER

## 2022-07-25 RX ORDER — KETOROLAC TROMETHAMINE 15 MG/ML
15 INJECTION, SOLUTION INTRAMUSCULAR; INTRAVENOUS
Status: DISCONTINUED | OUTPATIENT
Start: 2022-07-25 | End: 2022-07-28 | Stop reason: HOSPADM

## 2022-07-25 RX ADMIN — FENTANYL CITRATE 75 MCG: 50 INJECTION, SOLUTION INTRAMUSCULAR; INTRAVENOUS at 22:17

## 2022-07-25 RX ADMIN — ONDANSETRON 4 MG: 2 INJECTION INTRAMUSCULAR; INTRAVENOUS at 09:01

## 2022-07-25 RX ADMIN — HYDROMORPHONE HYDROCHLORIDE 2 MG: 2 INJECTION, SOLUTION INTRAMUSCULAR; INTRAVENOUS; SUBCUTANEOUS at 04:09

## 2022-07-25 RX ADMIN — SODIUM CHLORIDE, PRESERVATIVE FREE 10 ML: 5 INJECTION INTRAVENOUS at 13:54

## 2022-07-25 RX ADMIN — PIPERACILLIN AND TAZOBACTAM 3.38 G: 3; .375 INJECTION, POWDER, FOR SOLUTION INTRAVENOUS at 15:50

## 2022-07-25 RX ADMIN — SODIUM CHLORIDE, PRESERVATIVE FREE 10 ML: 5 INJECTION INTRAVENOUS at 22:18

## 2022-07-25 RX ADMIN — HYDROMORPHONE HYDROCHLORIDE 2 MG: 2 INJECTION, SOLUTION INTRAMUSCULAR; INTRAVENOUS; SUBCUTANEOUS at 09:02

## 2022-07-25 RX ADMIN — KETOROLAC TROMETHAMINE 15 MG: 15 INJECTION, SOLUTION INTRAMUSCULAR; INTRAVENOUS at 17:55

## 2022-07-25 RX ADMIN — SODIUM CHLORIDE, PRESERVATIVE FREE 10 ML: 5 INJECTION INTRAVENOUS at 05:52

## 2022-07-25 RX ADMIN — PIPERACILLIN AND TAZOBACTAM 3.38 G: 3; .375 INJECTION, POWDER, FOR SOLUTION INTRAVENOUS at 23:55

## 2022-07-25 RX ADMIN — PIPERACILLIN AND TAZOBACTAM 3.38 G: 3; .375 INJECTION, POWDER, FOR SOLUTION INTRAVENOUS at 08:56

## 2022-07-25 NOTE — PROGRESS NOTES
Spoke with Dr. Collin Govea this morning about putting in an inpatient order for the patient. He said he would do so today.

## 2022-07-25 NOTE — PROGRESS NOTES
Kyle Fregoso with Dr. Magaly Long updated him on patient's status. He asked me to make sure Dr. Hoang Ledezma is consulted for patient as patient is desating when sleeping. Kyle Fregoso with Dr. Hoang Ledezma and notified him of consult. Patient desating when sleeping, obstructing. May need to lower pain medication dose.

## 2022-07-25 NOTE — CONSULTS
Consult  Pulmonary, Critical Care    Name: Sudhakar Nelson MRN: [id number]   : 1975 Hospital: [facility]   Date: 2022  Admission date: 2022 Hospital Day: 4       Subjective/Interval History:   Notified of consult late this morning not sure when it was ordered. Apparently he had oxygen desaturation during the night while sleeping and is currently on a 40% Ventimask. He denies any past medical history does state that he is known to be a severe snorer and in fact will wake himself up with snorting sort of snores. He has vivid dreams on a nightly basis. 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 RECOMMENDATIONS/PLAN:   Would be worthwhile to undergo outpatient sleep study once he is discharged  Have encouraged him to do self incentive spirometry hourly  Have placed on room air at the current time will add nasal oxygen as needed  Up in chair and walking as soon as possible           Subjective/Initial History:   I have reviewed the flowsheet and previous days notes. I was asked by Nancy Diaz MD to see Sudhakar Nelson a 52 y.o.  male  in consultation for a chief complaint of acute hypoxic respiratory failure          Patient PCP: None  PMH:  has no past medical history on file. PSH:   has no past surgical history on file. FHX: family history is not on file. SHX:  reports that he has never smoked.  He has never used smokeless tobacco. He reports that he does not drink alcohol and does not use drugs. Systemic review:  General weight stable at home no chronic fever chills or sweats  Eyes no double vision or momentary blindness  Endocrinologic no polyuria polydipsia  Musculoskeletal no swollen tender joint  ENT no sinus congestion or drainage  Neurologic no seizures or syncope  Gastrointestinal acute onset abdominal pain at the time of admission with acute cholecystitis status post open cholecystectomy  Due to urinary no pain discomfort or bleeding on urination  Cardiovascular no history heart disease chest pain diaphoresis ankle edema or nocturia  Respiratory no chronic cough sputum production fever chills or sweats. He was a smoker stopped over 20 years ago no history of emphysema asthma. He does have severe snoring has been noted to have breath-holding spells at night with snorting type of respirations. Has vivid dreams almost every night.   Wakes up refreshed does not fall asleep during the daytime    No Known Allergies   MEDS:   Current Facility-Administered Medications   Medication    lactated Ringers infusion    piperacillin-tazobactam (ZOSYN) 3.375 g in 0.9% sodium chloride (MBP/ADV) 100 mL MBP    HYDROmorphone (DILAUDID) injection 2 mg    sodium chloride (NS) flush 5-40 mL    sodium chloride (NS) flush 5-40 mL    acetaminophen (TYLENOL) tablet 650 mg    Or    acetaminophen (TYLENOL) suppository 650 mg    polyethylene glycol (MIRALAX) packet 17 g    ondansetron (ZOFRAN ODT) tablet 4 mg    Or    ondansetron (ZOFRAN) injection 4 mg    0.9% sodium chloride infusion    *Please  patient's home medications from pharmacy at discharge*        Current Facility-Administered Medications:     lactated Ringers infusion, 125 mL/hr, IntraVENous, CONTINUOUS, Ahsan Hayes MD    piperacillin-tazobactam (ZOSYN) 3.375 g in 0.9% sodium chloride (MBP/ADV) 100 mL MBP, 3.375 g, IntraVENous, Q8H, Ahsan Hayes MD, Last Rate: 25 mL/hr at 07/25/22 0856, 3.375 g at 07/25/22 2230 HYDROmorphone (DILAUDID) injection 2 mg, 2 mg, IntraVENous, Q4H PRN, Ahsan Hayes MD, 2 mg at 22 0902    sodium chloride (NS) flush 5-40 mL, 5-40 mL, IntraVENous, Q8H, Rolanda Sutherland MD, 10 mL at 22 1690    sodium chloride (NS) flush 5-40 mL, 5-40 mL, IntraVENous, PRN, Rolanda Sutherland MD    acetaminophen (TYLENOL) tablet 650 mg, 650 mg, Oral, Q6H PRN **OR** acetaminophen (TYLENOL) suppository 650 mg, 650 mg, Rectal, Q6H PRN, Rolanda Sutherland MD    polyethylene glycol (MIRALAX) packet 17 g, 17 g, Oral, DAILY PRN, Rolanda Sutherland MD, 17 g at 22 1337    ondansetron (ZOFRAN ODT) tablet 4 mg, 4 mg, Oral, Q8H PRN **OR** ondansetron (ZOFRAN) injection 4 mg, 4 mg, IntraVENous, Q6H PRN, Rolanda Sutherland MD, 4 mg at 22 0901    0.9% sodium chloride infusion, 75 mL/hr, IntraVENous, CONTINUOUS, Rolanda Sutherland MD, Last Rate: 75 mL/hr at 22 0807, 75 mL/hr at 22 0807    *Please  patient's home medications from pharmacy at discharge*, 1 Each, Other, PRIOR TO DISCHARGE, Rolanda Sutherland MD      Objective:     Vital Signs: Telemetry:    normal sinus rhythm Intake/Output:   Visit Vitals  BP (!) 139/94   Pulse (!) 119   Temp 99.4 °F (37.4 °C)   Resp 13   Ht 5' 11\" (1.803 m)   Wt 103.9 kg (229 lb)   SpO2 (!) 79%   BMI 31.94 kg/m²       Temp (24hrs), Av.6 °F (37 °C), Min:97.8 °F (36.6 °C), Max:99.8 °F (37.7 °C)        O2 Device: Ventimask O2 Flow Rate (L/min): 15 l/min         Body mass index is 31.94 kg/m². Wt Readings from Last 4 Encounters:   22 103.9 kg (229 lb)   22 103.9 kg (229 lb)   22 103.9 kg (229 lb)          Intake/Output Summary (Last 24 hours) at 2022 1223  Last data filed at 2022 0150  Gross per 24 hour   Intake 1000 ml   Output 2310 ml   Net -1310 ml       Last shift:      No intake/output data recorded.   Last 3 shifts:  1901 -  0700  In: 4075 [I.V.:4075]  Out: 1307 [Urine:2190; Drains:70]       Hemodynamics:    CO:    CI: CVP:    SVR:   PAP Systolic:    PAP Diastolic:    PVR:    AO10:       Ventilator Settings:      Mode Rate TV Press PEEP FiO2 PIP Min. Vent               50 %            Physical Exam:    General:  male; lying in bed on Ventimask no distress no accessory muscle usage  HEENT: NCAT, normal oral mucosa  Eyes: anicteric; conjunctiva clear extraocular movements intact  Neck: no nodes, overweight status obscures determination of JVD no accessory MM use. Chest: no deformity,   Cardiac: Regular rate and rhythm  Lungs: Clear anteriorly and laterally with chest a few rales posteriorly  Abd: Gaston distended mildly tender bowel sounds present  Ext: no edema; no joint swelling; No clubbing  : clear urine  Neuro: Awake alert oriented speech is clear moves all 4 extremities  Psych- no agitation, oriented to person;   Skin: warm, dry, no cyanosis;   Pulses: Brachial and radial pulses intact  Capillary: Normal capillary refill      Labs:    Recent Labs     07/25/22  1135 07/24/22  1911 07/24/22  0625   WBC 15.4* 8.9 6.9   HGB 12.0* 11.6* 12.8    241 268     Recent Labs     07/24/22  0625 07/23/22  0615   ALB 3.2* 3.3*   ALT 67 47   LPSE 238  --          Imaging:  I have personally reviewed the patients radiographs and have reviewed the reports:    CXR Results  (Last 48 hours)                 07/24/22 2149  XR CHEST PORT Final result    Impression:  Shallow volumes and bibasilar atelectasis. Narrative:  PORTABLE CHEST RADIOGRAPH/S: 7/24/2022 9:49 PM       INDICATION: Aspiration. COMPARISON: None. TECHNIQUE: Portable frontal upright radiograph/s of the chest.       FINDINGS:    The lungs are hypoinflated and there is bibasilar atelectasis. The central   airways are patent. No pneumothorax and no large pleural effusion. An NG tube   extends below the hemidiaphragm.                   Results from [place] encounter on 07/22/22    XR CHEST PORT    Narrative  PORTABLE CHEST RADIOGRAPH/S: 7/24/2022 9:49 PM    INDICATION: Aspiration. COMPARISON: None. TECHNIQUE: Portable frontal upright radiograph/s of the chest.    FINDINGS:  The lungs are hypoinflated and there is bibasilar atelectasis. The central  airways are patent. No pneumothorax and no large pleural effusion. An NG tube  extends below the hemidiaphragm. Impression  Shallow volumes and bibasilar atelectasis. Results from [place] encounter on 07/20/22    CT ABD PELV WO CONT    Narrative  EXAM: CT of the abdomen and pelvis    INDICATION: Pain. COMPARISON: None. TECHNIQUE: Axial CT imaging of the abdomen and pelvis was performed without  intravenous contrast. Multiplanar reformats were generated. Dose reduction  techniques used: automated exposure control, adjustment of the mAs and/or kVp  according to patient size, and iterative reconstruction techniques. Digital  imaging and communications in Medicine (DICOM) format image data are available  to nonaffiliated external healthcare facilities or entities on a secure, media  free, reciprocally searchable basis with patient authorization for at least 12  months after this study. _______________    FINDINGS:    LOWER CHEST: Unremarkable. LIVER, BILIARY: Liver is normal. No biliary dilation. Gallstones are noted. There is gallbladder wall thickening/pericholecystic fluid. PANCREAS: Normal.    SPLEEN: Normal.    ADRENALS: Normal.    KIDNEYS: Normal.    LYMPH NODES: No enlarged lymph nodes. GASTROINTESTINAL TRACT: No bowel dilation or wall thickening. The appendix is  visualized and is within normal limits. PELVIC ORGANS: Unremarkable. VASCULATURE: Unremarkable. BONES: No acute or aggressive osseous abnormalities identified. OTHER: None.    _______________    Impression  Gallstones with gallbladder wall thickening/pericholecystic fluid. The  gallbladder disease. No other significant abnormality seen.     Discussion history is suspicious for obstructive sleep apnea and he would benefit from outpatient testing once he is discharged. At the current time he has significant atelectasis on the chest x-ray and I have encouraged him to do self incentive spirometry hourly. Bed rest pain medications and atelectasis could be the entire cause of his hypoxia. Will supplement as needed while he is hospitalized  Discussed with the patient and answered all questions  Time of care 40 minutes         Thank you for allowing us to participate in the care of this patient.   We will follow along with you     Morris Anand MD

## 2022-07-25 NOTE — PROGRESS NOTES
14: 48PM Writer informed by assigned RN Tanya Bruno, she spoke w/attending regarding patient needing to be flipped from OBS to inpatient.          Corina Briscoe, MSW

## 2022-07-25 NOTE — PROGRESS NOTES
Problem: Pain  Goal: *Control of Pain  Outcome: Progressing Towards Goal     Problem: Falls - Risk of  Goal: *Absence of Falls  Description: Document Jay Fall Risk and appropriate interventions in the flowsheet.   Outcome: Progressing Towards Goal  Note: Fall Risk Interventions:            Medication Interventions: Bed/chair exit alarm

## 2022-07-25 NOTE — PROGRESS NOTES
Comprehensive Nutrition Assessment    Type and Reason for Visit: Initial, Positive nutrition screen (MST 2)    Nutrition Recommendations/Plan:   NPO  Advance diet as medically able to goal of low-fat diet       Malnutrition Assessment:  Malnutrition Status:  No malnutrition (07/25/22 1559)    Context:  Acute illness     Findings of the 6 clinical characteristics of malnutrition:   Energy Intake:  No significant decrease in energy intake  Weight Loss:  No significant weight loss     Body Fat Loss:  No significant body fat loss (visual),     Muscle Mass Loss:  No significant muscle mass loss (visual),    Fluid Accumulation:  No significant fluid accumulation,        Nutrition Assessment:    Admitted for acute cholecystitis s/p (7/14) lap converted to open cholecystectomy. Pt now in ICU, remains NPO with NGT to suction. Nutrition assessment triggered for MST 2, unsure wt loss. Pt denies nutrition issues or wt loss pta. Denies return of appetite, endorses pain however is able to sleep w/o issue. Rd will continue to follow for NPO/Clear liquids, however no nutrition concerns at this time. Labs: H/H 12.0/35.8, Na 135, , ALT 98, Alk Phos 122, Alb 3.1. Meds: LR, Zosyn. Nutrition Related Findings:    NFPE deferred, pt appears well nourished. Denies hx dysphagia. No N/V, +NGT with dark green OP, >800ml (noted pt also consuming plenty of water and ice chips). Last BM pta, denies flatus. No edema. Wound Type: Surgical incision (abd)      Current Nutrition Intake & Therapies:  Average Meal Intake: NPO  Average Supplement Intake: NPO  DIET NPO    Anthropometric Measures:  Height: 5' 10.98\" (180.3 cm)  Ideal Body Weight (IBW): 172 lbs (78 kg)     Current Body Wt:  103.9 kg (229 lb 0.9 oz) (7/22; RD verified at bedside), 133.2 % IBW.  Bed scale  Current BMI (kg/m2): 32  Usual Body Weight: 104.3 kg (230 lb)  % Weight Change (Calculated): -0.4  Weight Adjustment: No adjustment                 BMI Category: Obese class 1 (BMI 30.0-34. 9)    Estimated Daily Nutrient Needs:  Energy Requirements Based On: Formula  Weight Used for Energy Requirements: Current  Energy (kcal/day): 1937kcals (MSJ x1.0)  Weight Used for Protein Requirements: Current  Protein (g/day): 135-166g (1.3-1.6g/kg, ERAS)  Method Used for Fluid Requirements: 1 ml/kcal  Fluid (ml/day): 1937ml    Nutrition Diagnosis:   No nutrition diagnosis at this time     Nutrition Interventions:   Food and/or Nutrient Delivery: Continue NPO, Start oral diet (PO as medically appropriate)  Nutrition Education/Counseling: No recommendations at this time  Coordination of Nutrition Care: Continue to monitor while inpatient  Plan of Care discussed with: pt    Goals:     Goals: Initiate PO diet, PO intake 50% or greater, within 2 days, other (specify)  Specify Other Goals: Wt maintenance within +/-0.5kg in 7 days    Nutrition Monitoring and Evaluation:   Behavioral-Environmental Outcomes: None identified  Food/Nutrient Intake Outcomes: Diet advancement/tolerance, Food and nutrient intake  Physical Signs/Symptoms Outcomes: Weight, GI status, Skin    Discharge Planning:     Too soon to determine    [employer]: Ext [id number], or via [address] [place] [address]

## 2022-07-25 NOTE — PROGRESS NOTES
S/P laparoscopic converted to open cholecystectomy, POD#1    Subjective:  Mr. Krystyna Starks is a 52y.o. year old * male admitted for acute cholecystitis s/p cholecystectomy 07/25/22. Patient was admitted to ICU following procedure. Found to be desaturating while sleeping. Patient subsequently placed on venturi mask. CXR was obtained given desaturation and possible concern of aspiration, notable only for atelectasis. Patient is drowsy and sedated appearing on exam due to pain medication. Has no complaints of pain. No reported fever, nausea or vomiting. Patient is tachycardic into the 116, WBC 15.4. Hgb 12.0. Review of Systems:   Constitutional:  no fever, no chills,  no sweats, No weakness, No fatigue, No decreased activity. Respiratory: No shortness of breath, No cough, No sputum production, No hemoptysis, No wheezing, No cyanosis. Cardiovascular: No chest pain, No palpitations, No bradycardia, No tachycardia, No peripheral edema, No syncope. Gastrointestinal: No nausea, No vomiting, No diarrhea, No constipation, No heartburn, No abdominal pain. Genitourinary: No dysuria, No hematuria, No change in urine stream, No urethral discharge, No lesions. Hematology/Lymphatics: No bruising tendency, No bleeding tendency, No petechiae, No swollen lymph glands. Endocrine: No excessive thirst, No polyuria, No cold intolerance, No heat intolerance, No excessive hunger. Musculoskeletal: No back pain, No neck pain, No joint pain, No muscle pain, No claudication, No decreased range of motion, No trauma. Integumentary: No rash, No pruritus, No abrasions. Neurologic: Alert and oriented X4, No abnormal balance, No headache, No confusion, No numbness, No tingling. Psychiatric: No anxiety, No depression, No mookie. Physical Exam:     Vitals & Measurements:     Wt Readings from Last 3 Encounters:   07/22/22 103.9 kg (229 lb)   07/21/22 103.9 kg (229 lb)   07/20/22 103.9 kg (229 lb)     Temp Readings from Last 3 Encounters:   07/25/22 99.4 °F (37.4 °C)   07/21/22 98.5 °F (36.9 °C)   07/20/22 97.6 °F (36.4 °C)     BP Readings from Last 3 Encounters:   07/25/22 132/75   07/22/22 (!) 163/93   07/21/22 (!) 152/100     Pulse Readings from Last 3 Encounters:   07/25/22 (!) 131   07/22/22 78   07/21/22 84      Ht Readings from Last 3 Encounters:   07/22/22 5' 11\" (1.803 m)   07/21/22 5' 11\" (1.803 m)   07/20/22 5' 11\" (1.803 m)      Date 07/24/22 0700 - 07/25/22 0659 07/25/22 0700 - 07/26/22 0659   Shift 2887-6642 7158-3993 24 Hour Total 0718-3326 4040-0974 24 Hour Total   INTAKE   I.V.(mL/kg/hr) 3250(2.6)  3250(1.3)        Volume (lactated Ringers infusion) 3000  3000        Volume (albumin human 5% (BUMINATE) solution) 250  250      Shift Total(mL/kg) 3250(31.3)  3250(31.3)      OUTPUT   Urine(mL/kg/hr) 840(0.7) 1350(1.1) 2190(0.9)        Urine Output 200  200        Urine Output (mL) (Urinary Catheter 07/24/22 2- way; [place]) 640 1350 1990      Emesis/NG output  50 50        Output (ml) (Nasogastric Tube 07/24/22)  50 50      Drains 40 30 70        Output (ml) (Angel-[place] Drain 07/24/22 Anterior;Mid Abdomen) 40 30 70      Blood 200  200        Estimated Blood Loss 200  200      Shift Total(mL/kg) 1080(10.4) 1430(13.8) 2510(24.2)      NET 2170 -1430 740      Weight (kg) 103.9 103.9 103.9 103.9 103.9 103.9       General: drowsy/sedated appearing, no acute distress  Head: Normal  Face: Nornal  HEENT: atraumatic, PERRLA, moist mucosa, normal pharynx, normal tonsils and adenoids, normal tongue, no fluid in sinuses. + venturi mask and NG tube. 500cc bilious output noted at bedside. Neck: Trachea midline, no carotid bruit, no masses  Chest: Normal.  Respiratory: normal chest wall expansion, CTA B, no r/r/w, no rubs  Cardiovascular: Tachycardic, regular rhythm, no m/r/g, Normal S1 and S2  Abdomen: Soft, non tender, non-distended, normal bowel sounds in all quadrants, no hepatosplenomegaly, no tympany.  Incision scar: dressings intact over incision sites. +ISRRAEL drain with serous o& minimal output. Genitourinary: No inguinal hernia, normal external gentalia, Testis & scrotum normal, no renal angle tenderness. + [place]. Rectal: deferred  Musculoskeletal: Normal ROM in upper and lower extremities, No joint swelling. Integumentary: Warm, dry, and pink, with no rash, purpura, or petechia  Heme/Lymph: No lymphadenopathy, no bruises  Neurological: Cranial Nerves II-XII grossly intact, No gross sensory or motor deficit. Psychiatric: Cooperative with normal mood, affect, and cognition    Laboratory Values:   Recent Results (from the past 24 hour(s))   CBC WITH AUTOMATED DIFF    Collection Time: 07/24/22  7:11 PM   Result Value Ref Range    WBC 8.9 4.1 - 11.1 K/uL    RBC 3.70 (L) 4.10 - 5.70 M/uL    HGB 11.6 (L) 12.1 - 17.0 g/dL    HCT 33.8 (L) 36.6 - 50.3 %    MCV 91.4 80.0 - 99.0 FL    MCH 31.4 26.0 - 34.0 PG    MCHC 34.3 30.0 - 36.5 g/dL    RDW 12.1 11.5 - 14.5 %    PLATELET 059 508 - 637 K/uL    MPV 9.1 8.9 - 12.9 FL    NRBC 0.0 0.0  WBC    ABSOLUTE NRBC 0.00 0.00 - 0.01 K/uL    NEUTROPHILS 85 (H) 32 - 75 %    LYMPHOCYTES 5 (L) 12 - 49 %    MONOCYTES 9 5 - 13 %    EOSINOPHILS 0 0 - 7 %    BASOPHILS 0 0 - 1 %    IMMATURE GRANULOCYTES 1 (H) 0 - 0.5 %    ABS. NEUTROPHILS 7.6 1.8 - 8.0 K/UL    ABS. LYMPHOCYTES 0.5 (L) 0.8 - 3.5 K/UL    ABS. MONOCYTES 0.8 0.0 - 1.0 K/UL    ABS. EOSINOPHILS 0.0 0.0 - 0.4 K/UL    ABS. BASOPHILS 0.0 0.0 - 0.1 K/UL    ABS. IMM.  GRANS. 0.0 0.00 - 0.04 K/UL    DF AUTOMATED     CBC WITH AUTOMATED DIFF    Collection Time: 07/25/22 11:35 AM   Result Value Ref Range    WBC 15.4 (H) 4.1 - 11.1 K/uL    RBC 3.82 (L) 4.10 - 5.70 M/uL    HGB 12.0 (L) 12.1 - 17.0 g/dL    HCT 35.8 (L) 36.6 - 50.3 %    MCV 93.7 80.0 - 99.0 FL    MCH 31.4 26.0 - 34.0 PG    MCHC 33.5 30.0 - 36.5 g/dL    RDW 12.1 11.5 - 14.5 %    PLATELET 723 505 - 522 K/uL    MPV 9.1 8.9 - 12.9 FL    NRBC 0.0 0.0  WBC    ABSOLUTE NRBC 0.00 0.00 - 0.01 K/uL    NEUTROPHILS 81 (H) 32 - 75 %    LYMPHOCYTES 7 (L) 12 - 49 %    MONOCYTES 11 5 - 13 %    EOSINOPHILS 0 0 - 7 %    BASOPHILS 0 0 - 1 %    IMMATURE GRANULOCYTES 1 (H) 0 - 0.5 %    ABS. NEUTROPHILS 12.5 (H) 1.8 - 8.0 K/UL    ABS. LYMPHOCYTES 1.1 0.8 - 3.5 K/UL    ABS. MONOCYTES 1.7 (H) 0.0 - 1.0 K/UL    ABS. EOSINOPHILS 0.0 0.0 - 0.4 K/UL    ABS. BASOPHILS 0.0 0.0 - 0.1 K/UL    ABS. IMM. GRANS. 0.1 (H) 0.00 - 0.04 K/UL    DF AUTOMATED     HEPATIC FUNCTION PANEL    Collection Time: 07/25/22 11:35 AM   Result Value Ref Range    Protein, total 6.7 6.4 - 8.2 g/dL    Albumin 3.1 (L) 3.5 - 5.0 g/dL    Globulin 3.6 2.0 - 4.0 g/dL    A-G Ratio 0.9 (L) 1.1 - 2.2      Bilirubin, total 0.6 0.2 - 1.0 mg/dL    Bilirubin, direct 0.1 0.0 - 0.2 mg/dL    Alk. phosphatase 122 (H) 45 - 117 U/L    AST (SGOT) 133 (H) 15 - 37 U/L    ALT (SGPT) 98 (H) 12 - 78 U/L   NT-PRO BNP    Collection Time: 07/25/22 11:35 AM   Result Value Ref Range    NT pro- (H) <125 pg/mL           XR CHEST PORT   Final Result   Shallow volumes and bibasilar atelectasis. XR FLUOROSCOPY UNDER 60 MINUTES    (Results Pending)   XR CHEST PORT    (Results Pending)   XR CHEST PORT    (Results Pending)         Assessment:  Problem List Items Addressed This Visit          Digestive    Cholecystitis - Primary      S/P laparoscopic converted to open cholecystectomy 07/24/22, POD # 1      Plan:    Admission  Diet: NPO  IV fluids  SCD  IS  Pain medications: transition to Toradol instead of Dilaudid. Supplemental O2 as needed  Antibiotics: Zosyn  Nausea medication  [place]  NG to low continuous wall suction  Labs in AM   Consult: Appreciate Pulmonology consult. Will continue to monitor patient's post-op status     Thank you for allowing me to participate in the care of this patient.

## 2022-07-25 NOTE — ANESTHESIA POSTPROCEDURE EVALUATION
Procedure(s):  CHOLECYSTECTOMY LAPAROSCOPIC CONVERTED TO OPEN CHOLECYSTECTOMY. general    Anesthesia Post Evaluation      Multimodal analgesia: multimodal analgesia used between 6 hours prior to anesthesia start to PACU discharge  Patient location during evaluation: PACU  Patient participation: complete - patient participated  Level of consciousness: awake  Pain score: 0  Pain management: adequate  Airway patency: patent  Anesthetic complications: no  Cardiovascular status: acceptable  Respiratory status: acceptable  Hydration status: acceptable  Post anesthesia nausea and vomiting:  controlled  Final Post Anesthesia Temperature Assessment:  Normothermia (36.0-37.5 degrees C)      INITIAL Post-op Vital signs:   Vitals Value Taken Time   /85 07/24/22 1415   Temp 36.6 °C (97.8 °F) 07/24/22 1319   Pulse 117 07/24/22 1421   Resp 12 07/24/22 1421   SpO2 92 % 07/24/22 1421   Vitals shown include unvalidated device data.

## 2022-07-26 ENCOUNTER — APPOINTMENT (OUTPATIENT)
Dept: GENERAL RADIOLOGY | Age: 47
DRG: 414 | End: 2022-07-26
Attending: INTERNAL MEDICINE
Payer: COMMERCIAL

## 2022-07-26 LAB
ALBUMIN SERPL-MCNC: 2.2 G/DL (ref 3.5–5)
ANION GAP SERPL CALC-SCNC: 12 MMOL/L (ref 5–15)
BASOPHILS # BLD: 0 K/UL (ref 0–0.1)
BASOPHILS NFR BLD: 0 % (ref 0–1)
BUN SERPL-MCNC: 10 MG/DL (ref 6–20)
BUN/CREAT SERPL: 14 (ref 12–20)
CA-I BLD-MCNC: 7.5 MG/DL (ref 8.5–10.1)
CHLORIDE SERPL-SCNC: 104 MMOL/L (ref 97–108)
CO2 SERPL-SCNC: 23 MMOL/L (ref 21–32)
COVID-19 RAPID TEST, COVR: DETECTED
CREAT SERPL-MCNC: 0.72 MG/DL (ref 0.7–1.3)
DIFFERENTIAL METHOD BLD: ABNORMAL
EOSINOPHIL # BLD: 0 K/UL (ref 0–0.4)
EOSINOPHIL NFR BLD: 0 % (ref 0–7)
ERYTHROCYTE [DISTWIDTH] IN BLOOD BY AUTOMATED COUNT: 11.9 % (ref 11.5–14.5)
GLUCOSE SERPL-MCNC: 70 MG/DL (ref 65–100)
HCT VFR BLD AUTO: 28.9 % (ref 36.6–50.3)
HGB BLD-MCNC: 9.8 G/DL (ref 12.1–17)
IMM GRANULOCYTES # BLD AUTO: 0 K/UL (ref 0–0.04)
IMM GRANULOCYTES NFR BLD AUTO: 0 % (ref 0–0.5)
LYMPHOCYTES # BLD: 0.9 K/UL (ref 0.8–3.5)
LYMPHOCYTES NFR BLD: 8 % (ref 12–49)
MCH RBC QN AUTO: 31.3 PG (ref 26–34)
MCHC RBC AUTO-ENTMCNC: 33.9 G/DL (ref 30–36.5)
MCV RBC AUTO: 92.3 FL (ref 80–99)
MONOCYTES # BLD: 0.7 K/UL (ref 0–1)
MONOCYTES NFR BLD: 7 % (ref 5–13)
NEUTS SEG # BLD: 8.8 K/UL (ref 1.8–8)
NEUTS SEG NFR BLD: 85 % (ref 32–75)
NRBC # BLD: 0 K/UL (ref 0–0.01)
NRBC BLD-RTO: 0 PER 100 WBC
PHOSPHATE SERPL-MCNC: 1.6 MG/DL (ref 2.6–4.7)
PLATELET # BLD AUTO: 212 K/UL (ref 150–400)
PMV BLD AUTO: 9.4 FL (ref 8.9–12.9)
POTASSIUM SERPL-SCNC: 3.6 MMOL/L (ref 3.5–5.1)
RBC # BLD AUTO: 3.13 M/UL (ref 4.1–5.7)
SODIUM SERPL-SCNC: 139 MMOL/L (ref 136–145)
WBC # BLD AUTO: 10.5 K/UL (ref 4.1–11.1)

## 2022-07-26 PROCEDURE — 97530 THERAPEUTIC ACTIVITIES: CPT

## 2022-07-26 PROCEDURE — 74011250637 HC RX REV CODE- 250/637: Performed by: INTERNAL MEDICINE

## 2022-07-26 PROCEDURE — 74011000250 HC RX REV CODE- 250: Performed by: SURGERY

## 2022-07-26 PROCEDURE — 80069 RENAL FUNCTION PANEL: CPT

## 2022-07-26 PROCEDURE — 65270000029 HC RM PRIVATE

## 2022-07-26 PROCEDURE — 36415 COLL VENOUS BLD VENIPUNCTURE: CPT

## 2022-07-26 PROCEDURE — 71045 X-RAY EXAM CHEST 1 VIEW: CPT

## 2022-07-26 PROCEDURE — 97161 PT EVAL LOW COMPLEX 20 MIN: CPT

## 2022-07-26 PROCEDURE — 74011000258 HC RX REV CODE- 258: Performed by: SURGERY

## 2022-07-26 PROCEDURE — 87635 SARS-COV-2 COVID-19 AMP PRB: CPT

## 2022-07-26 PROCEDURE — 74011250637 HC RX REV CODE- 250/637: Performed by: SURGERY

## 2022-07-26 PROCEDURE — 97165 OT EVAL LOW COMPLEX 30 MIN: CPT

## 2022-07-26 PROCEDURE — 74011250636 HC RX REV CODE- 250/636: Performed by: SURGERY

## 2022-07-26 PROCEDURE — G0378 HOSPITAL OBSERVATION PER HR: HCPCS

## 2022-07-26 PROCEDURE — 85025 COMPLETE CBC W/AUTO DIFF WBC: CPT

## 2022-07-26 RX ORDER — SODIUM,POTASSIUM PHOSPHATES 280-250MG
2 POWDER IN PACKET (EA) ORAL EVERY 4 HOURS
Status: COMPLETED | OUTPATIENT
Start: 2022-07-26 | End: 2022-07-26

## 2022-07-26 RX ADMIN — POTASSIUM & SODIUM PHOSPHATES POWDER PACK 280-160-250 MG 2 PACKET: 280-160-250 PACK at 13:16

## 2022-07-26 RX ADMIN — SODIUM CHLORIDE, PRESERVATIVE FREE 10 ML: 5 INJECTION INTRAVENOUS at 21:34

## 2022-07-26 RX ADMIN — FENTANYL CITRATE 75 MCG: 50 INJECTION, SOLUTION INTRAMUSCULAR; INTRAVENOUS at 07:18

## 2022-07-26 RX ADMIN — PIPERACILLIN AND TAZOBACTAM 3.38 G: 3; .375 INJECTION, POWDER, FOR SOLUTION INTRAVENOUS at 16:05

## 2022-07-26 RX ADMIN — SODIUM CHLORIDE, PRESERVATIVE FREE 10 ML: 5 INJECTION INTRAVENOUS at 07:19

## 2022-07-26 RX ADMIN — SODIUM CHLORIDE, PRESERVATIVE FREE 10 ML: 5 INJECTION INTRAVENOUS at 13:16

## 2022-07-26 RX ADMIN — POTASSIUM & SODIUM PHOSPHATES POWDER PACK 280-160-250 MG 2 PACKET: 280-160-250 PACK at 17:39

## 2022-07-26 RX ADMIN — KETOROLAC TROMETHAMINE 15 MG: 15 INJECTION, SOLUTION INTRAMUSCULAR; INTRAVENOUS at 09:01

## 2022-07-26 RX ADMIN — SODIUM CHLORIDE, POTASSIUM CHLORIDE, SODIUM LACTATE AND CALCIUM CHLORIDE 125 ML/HR: 600; 310; 30; 20 INJECTION, SOLUTION INTRAVENOUS at 16:06

## 2022-07-26 RX ADMIN — ONDANSETRON 4 MG: 2 INJECTION INTRAMUSCULAR; INTRAVENOUS at 21:34

## 2022-07-26 RX ADMIN — ACETAMINOPHEN 650 MG: 325 TABLET, FILM COATED ORAL at 21:34

## 2022-07-26 RX ADMIN — KETOROLAC TROMETHAMINE 15 MG: 15 INJECTION, SOLUTION INTRAMUSCULAR; INTRAVENOUS at 21:34

## 2022-07-26 RX ADMIN — FENTANYL CITRATE 75 MCG: 50 INJECTION, SOLUTION INTRAMUSCULAR; INTRAVENOUS at 03:33

## 2022-07-26 RX ADMIN — POTASSIUM & SODIUM PHOSPHATES POWDER PACK 280-160-250 MG 2 PACKET: 280-160-250 PACK at 10:00

## 2022-07-26 RX ADMIN — PIPERACILLIN AND TAZOBACTAM 3.38 G: 3; .375 INJECTION, POWDER, FOR SOLUTION INTRAVENOUS at 08:04

## 2022-07-26 RX ADMIN — ACETAMINOPHEN 650 MG: 325 TABLET, FILM COATED ORAL at 09:59

## 2022-07-26 RX ADMIN — ONDANSETRON 4 MG: 2 INJECTION INTRAMUSCULAR; INTRAVENOUS at 07:18

## 2022-07-26 RX ADMIN — SODIUM CHLORIDE, POTASSIUM CHLORIDE, SODIUM LACTATE AND CALCIUM CHLORIDE 125 ML/HR: 600; 310; 30; 20 INJECTION, SOLUTION INTRAVENOUS at 08:04

## 2022-07-26 NOTE — PROGRESS NOTES
PT eval order received and acknowledged, however, patient transferred to ICU from [facility] due to medical decline prior to completion of evaluation. PT eval orders will be discontinued and will need new PT eval order once pt medically stable for evaluation. Thank you.

## 2022-07-26 NOTE — PROGRESS NOTES
S/P laparoscopic converted to open cholecystectomy, POD#2    Subjective:  Mr. Brown Cooks is a 52y.o. year old * male admitted for acute cholecystitis s/p Open cholecystectomy 07/25/22. Feels lot better. Pain is well controlled. No fever, nausea or vomiting. Pt was able to get up and ambulate to chair. Has passed flatus today, no BM yet. Per RN patient has not had any output from his NG since yesterday. Tachycardia has improved, HR 80-90 on monitor at bedside. WBC improved to 10.5 compared to previous of 15.4. Patient + for COVID-19. Review of Systems:   Constitutional:  no fever, no chills,  no sweats, No weakness, No fatigue, No decreased activity. Respiratory: No shortness of breath, No cough, No sputum production, No hemoptysis, No wheezing, No cyanosis. Cardiovascular: No chest pain, No palpitations, No bradycardia, No tachycardia, No peripheral edema, No syncope. Gastrointestinal: No nausea, No vomiting, No diarrhea, No constipation, No heartburn, No abdominal pain. Genitourinary: No dysuria, No hematuria, No change in urine stream, No urethral discharge, No lesions. Hematology/Lymphatics: No bruising tendency, No bleeding tendency, No petechiae, No swollen lymph glands. Endocrine: No excessive thirst, No polyuria, No cold intolerance, No heat intolerance, No excessive hunger. Musculoskeletal: No back pain, No neck pain, No joint pain, No muscle pain, No claudication, No decreased range of motion, No trauma. Integumentary: No rash, No pruritus, No abrasions. Neurologic: Alert and oriented X4, No abnormal balance, No headache, No confusion, No numbness, No tingling. Psychiatric: No anxiety, No depression, No mookie. Physical Exam:     Vitals & Measurements:     Wt Readings from Last 3 Encounters:   07/22/22 103.9 kg (229 lb)   07/21/22 103.9 kg (229 lb)   07/20/22 103.9 kg (229 lb)     Temp Readings from Last 3 Encounters:   07/26/22 98 °F (36.7 °C)   07/21/22 98.5 °F (36.9 °C)   07/20/22 97.6 °F (36.4 °C)     BP Readings from Last 3 Encounters:   07/26/22 106/71   07/22/22 (!) 163/93   07/21/22 (!) 152/100     Pulse Readings from Last 3 Encounters:   07/26/22 89   07/22/22 78   07/21/22 84      Ht Readings from Last 3 Encounters:   07/25/22 5' 10.98\" (1.803 m)   07/21/22 5' 11\" (1.803 m)   07/20/22 5' 11\" (1.803 m)      Date 07/25/22 0700 - 07/26/22 0659 07/26/22 0700 - 07/27/22 0659   Shift 0814-9655 4563-6391 24 Hour Total 6487-3247 4792-8153 24 Hour Total   INTAKE   I.V.(mL/kg/hr) 300(0.2)  300(0.1) 2283.3  2283. 3     Volume (lactated Ringers infusion) 0  0 2083. 3  2083. 3     Volume (piperacillin-tazobactam (ZOSYN) 3.375 g in 0.9% sodium chloride (MBP/ADV) 100 mL MBP) 300  300 200  200   NG/GT    110  110     Medication Volume (Nasogastric Tube 07/24/22)    110  110   Shift Total(mL/kg) 300(2.9)  300(2.9) 0128.8(86)  7737.5(55)   OUTPUT   Urine(mL/kg/hr) 675(0.5) 500(0.4) 1175(0.5)        Urine Output (mL) (Urinary Catheter 07/24/22 2- way; [place])       Emesis/NG output 1350  1350 0  0     Output (ml) (Nasogastric Tube 07/24/22) 1350  1350 0  0   Drains 10 200 210 110  110     Output (ml) (Angel-De [place] Santos Drain 07/24/22 Anterior;Mid Abdomen) 10 200 210 110  110   Shift Total(mL/kg) 1776(57.9) 700(6.7) 2735(26.3) 110(1.1)  110(1.1)   NET -7158 -283 -9568 2283.3  2283. 3   Weight (kg) 103.9 103.9 103.9 103.9 103.9 103.9       General: fatigued appearing, no acute distress  Head: Normal  Face: Nornal  HEENT: atraumatic, PERRLA, moist mucosa, normal pharynx, normal tonsils and adenoids, normal tongue, no fluid in sinuses. + NG tube. 500cc bilious output noted at bedside but this is from yesterday per RN. + nasal cannula.    Neck: Trachea midline, no carotid bruit, no masses  Chest: Normal.  Respiratory: normal chest wall expansion, CTA B, no r/r/w, no rubs  Cardiovascular: Tachycardic, regular rhythm, no m/r/g, Normal S1 and S2  Abdomen: Soft, non tender, non-distended, normal bowel sounds in all quadrants, no hepatosplenomegaly, no tympany. Incision scar: dressings intact over incision sites. Appropriate tenderness near incision sites. +ISRRAEL drain with serous & minimal output. Genitourinary: No inguinal hernia, normal external gentalia, Testis & scrotum normal, no renal angle tenderness. + [place]. Rectal: deferred  Musculoskeletal: Normal ROM in upper and lower extremities, No joint swelling. Integumentary: Warm, dry, and pink, with no rash, purpura, or petechia  Heme/Lymph: No lymphadenopathy, no bruises  Neurological: Cranial Nerves II-XII grossly intact, No gross sensory or motor deficit. Psychiatric: Cooperative with normal mood, affect, and cognition    Laboratory Values:   Recent Results (from the past 24 hour(s))   CBC WITH AUTOMATED DIFF    Collection Time: 07/26/22  4:50 AM   Result Value Ref Range    WBC 10.5 4.1 - 11.1 K/uL    RBC 3.13 (L) 4.10 - 5.70 M/uL    HGB 9.8 (L) 12.1 - 17.0 g/dL    HCT 28.9 (L) 36.6 - 50.3 %    MCV 92.3 80.0 - 99.0 FL    MCH 31.3 26.0 - 34.0 PG    MCHC 33.9 30.0 - 36.5 g/dL    RDW 11.9 11.5 - 14.5 %    PLATELET 431 308 - 520 K/uL    MPV 9.4 8.9 - 12.9 FL    NRBC 0.0 0.0  WBC    ABSOLUTE NRBC 0.00 0.00 - 0.01 K/uL    NEUTROPHILS 85 (H) 32 - 75 %    LYMPHOCYTES 8 (L) 12 - 49 %    MONOCYTES 7 5 - 13 %    EOSINOPHILS 0 0 - 7 %    BASOPHILS 0 0 - 1 %    IMMATURE GRANULOCYTES 0 0 - 0.5 %    ABS. NEUTROPHILS 8.8 (H) 1.8 - 8.0 K/UL    ABS. LYMPHOCYTES 0.9 0.8 - 3.5 K/UL    ABS. MONOCYTES 0.7 0.0 - 1.0 K/UL    ABS. EOSINOPHILS 0.0 0.0 - 0.4 K/UL    ABS. BASOPHILS 0.0 0.0 - 0.1 K/UL    ABS. IMM.  GRANS. 0.0 0.00 - 0.04 K/UL    DF AUTOMATED     RENAL FUNCTION PANEL    Collection Time: 07/26/22  4:50 AM   Result Value Ref Range    Sodium 139 136 - 145 mmol/L    Potassium 3.6 3.5 - 5.1 mmol/L    Chloride 104 97 - 108 mmol/L    CO2 23 21 - 32 mmol/L    Anion gap 12 5 - 15 mmol/L    Glucose 70 65 - 100 mg/dL    BUN 10 6 - 20 mg/dL    Creatinine 0.72 0.70 - 1.30 mg/dL    BUN/Creatinine ratio 14 12 - 20      GFR est AA >60 >60 ml/min/1.73m2    GFR est non-AA >60 >60 ml/min/1.73m2    Calcium 7.5 (L) 8.5 - 10.1 mg/dL    Phosphorus 1.6 (L) 2.6 - 4.7 mg/dL    Albumin 2.2 (L) 3.5 - 5.0 g/dL           XR CHEST PORT   Final Result   1. Pulmonary interstitial edema. Trace left pleural effusion and left basilar   atelectasis. 2. More focal left lower lobe retrocardiac opacity may represent   aspiration/infection versus atelectasis. XR CHEST PORT   Final Result      Increasing right upper lobe subsegmental atelectasis. Persistent left lower lobe subsegmental atelectasis. XR CHEST PORT   Final Result   Shallow volumes and bibasilar atelectasis. XR FLUOROSCOPY UNDER 60 MINUTES   Final Result      XR CHEST PORT    (Results Pending)         Assessment:  Problem List Items Addressed This Visit          Digestive    Cholecystitis - Primary      S/P laparoscopic converted to open cholecystectomy 07/24/22, POD #2      Plan:    Admission: Continue ICU given +COVID-19   Discontinue NG   D/C pitts  Diet: Trial clear liquids  IV fluids  SCD  IS  Pain medications: Toradol. Supplemental O2 as needed  Antibiotics: Zosyn  Nausea medication  Labs in AM  PT   Will continue to monitor patient's post-op status     Thank you for allowing me to participate in the care of this patient.

## 2022-07-26 NOTE — PROGRESS NOTES
Problem: Pain  Goal: *Control of Pain  Outcome: Progressing Towards Goal     Problem: Falls - Risk of  Goal: *Absence of Falls  Description: Document Jay Fall Risk and appropriate interventions in the flowsheet.   Outcome: Progressing Towards Goal  Note: Fall Risk Interventions:  Mobility Interventions: Bed/chair exit alarm, Patient to call before getting OOB, PT Consult for mobility concerns         Medication Interventions: Bed/chair exit alarm, Patient to call before getting OOB, Evaluate medications/consider consulting pharmacy, Teach patient to arise slowly

## 2022-07-26 NOTE — PROGRESS NOTES
OCCUPATIONAL THERAPY EVALUATION  Patient: Esteban Drew (64 y.o. male)  Date: 7/26/2022  Primary Diagnosis: Cholecystitis [K81.9]  Acute cholecystitis [K81.0]  Procedure(s) (LRB):  CHOLECYSTECTOMY LAPAROSCOPIC CONVERTED TO OPEN CHOLECYSTECTOMY (N/A) 2 Days Post-Op   Precautions: fall risk       ASSESSMENT  Pt is a 51 y/o M with no significant PMH presenting to [facility] as a transfer from [facility] for surgical consult and admission 2/2 cholecystitis. Per chart, pt reported 3-4 day hx of gradually worsening RUQ pain radiation to his back. CT abdomen/pelvis was consistent with acute cholecystitis with gallstones and gallbladder wall thickening/pericholecystic fluid. Pt is s/p laparoscopic procedure converted to open cholecystectomy with 19Fr drain placement with Dr. Aggie Riojas on 7/24/22. Pt admitted to ICU following procedure for oxygen desaturation, currently on 4L O2 via NC (97% at rest). CXR was obtained given desaturation and possible concern of aspiration, notable only for atelectasis. New OT evaluation order placed 7/26/22 following transfer to ICU. Pt received semi-supine in bed upon arrival, NG tube, pitts catheter, O2 line noted. Pt AXO x4 and agreeable to OT/PT evaluations at this time. Per pt report, pt lives with spouse and children in a one-story trailer home with 5 SOSA and B HR, was IND with ADLs/IADLs working full time as a  for [employer] and and ambulatory without AD at [employer]. No DME owned at this time. Based on current observations, pt presents with deficits in generalized strength/AROM, dynamic standing balance, functional activity tolerance and pain impacting overall performance of ADLs and functional transfers/mobility. Pt currently MI for bed mobility/supine>sit, setup donning socks seated EOB crossing LE over opposite knee, and SBA sit><stand transfers to/from bed>chair pushing IV pole with no LOB noted.  Pt setup for retrieval only with basic grooming (washing face, oral care) with intact UE AROM/coordination observed. Overall, pt tolerates session fair with c/o 4/10 abdominal pain with activity, Spo2 stable on 4L. Pt would benefit from continued skilled acute OT services to address current impairments and improve IND and safety with self cares and functional transfers/mobility. Current OT d/c recommendation home with family/self care once medically appropriate. Other factors to consider for discharge: family/social support, DME, time since onset, severity of deficits, functional baseline     Patient will benefit from skilled therapy intervention to address the above noted impairments. PLAN :  Recommendations and Planned Interventions: self care training, functional mobility training, therapeutic exercise, balance training, therapeutic activities, endurance activities, patient education, home safety training, and family training/education    Frequency/Duration: Patient will be followed by occupational therapy:  2-3x/week to address goals. Recommendation for discharge: (in order for the patient to meet his/her long term goals)  Home with family/self care    This discharge recommendation:  Has been made in collaboration with the attending provider and/or case management    IF patient discharges home will need the following DME: none       SUBJECTIVE:   Patient stated I am doing better.     OBJECTIVE DATA SUMMARY:   HISTORY:   History reviewed. No pertinent past medical history. No past surgical history on file.     Expanded or extensive additional review of patient history:     Home Situation  Home Environment: Trailer/mobile home  # Steps to Enter: 5  Rails to Enter: Yes  Hand Rails : Bilateral  One/Two Story Residence: One story  Living Alone: No  Support Systems: Spouse/Significant Other, Child(yanique)  Patient Expects to be Discharged to[de-identified] Home  Current DME Used/Available at Home: None  Tub or Shower Type: Shower (walk in shower)      EXAMINATION OF PERFORMANCE DEFICITS:  Cognitive/Behavioral Status:  Neurologic State: Alert  Orientation Level: Oriented X4  Cognition: Follows commands               Hearing: Auditory  Auditory Impairment: None      Range of Motion:    AROM: Within functional limits                         Strength:    Strength: Within functional limits                Coordination:  Coordination: Within functional limits  Fine Motor Skills-Upper: Left Intact; Right Intact    Gross Motor Skills-Upper: Left Intact; Right Intact    Tone & Sensation:     Sensation: Intact                      Balance:  Sitting: Intact; Without support  Standing: Intact; Without support    Functional Mobility and Transfers for ADLs:  Bed Mobility:  Rolling: Modified independent  Supine to Sit: Modified independent  Scooting: Modified independent    Transfers:  Sit to Stand: Stand-by assistance  Stand to Sit: Stand-by assistance  Bed to Chair: Stand-by assistance    ADL Intervention and task modifications:       Grooming  Grooming Assistance: Set-up  Position Performed: Seated in chair  Washing Face: Set-up  Brushing Teeth: Set-up                   Lower Body Dressing Assistance  Socks: Set-up; Supervision  Leg Crossed Method Used: Yes  Position Performed: Seated edge of bed              Therapeutic Exercise:  Pt would benefit from UE HEP to improve overall UE AROM/strength and can be further educated in next treatment session. Functional Measure:    Rafa SEYA \"6 Clicks\"                                                       Daily Activity Inpatient Short Form  How much help from another person does the patient currently need. .. Total; A Lot A Little None   1. Putting on and taking off regular lower body clothing? []  1 []  2 [x]  3 []  4   2. Bathing (including washing, rinsing, drying)? []  1 []  2 [x]  3 []  4   3. Toileting, which includes using toilet, bedpan or urinal? [] 1 []  2 []  3 [x]  4   4. Putting on and taking off regular upper body clothing?  [] 1 []  2 []  3 [x]  4   5. Taking care of personal grooming such as brushing teeth? []  1 []  2 []  3 [x]  4   6. Eating meals? []  1 []  2 []  3 [x]  4   © , Trustees of [facility] [employer], under license to [employer]. All rights reserved     Score: 22/24     Interpretation of Tool:  Represents clinically-significant functional categories (i.e. Activities of daily living). Percentage of Impairment CH    0%   CI    1-19% CJ    20-39% CK    40-59% CL    60-79% CM    80-99% CN     100%   [facility]  Score 6-24 24 23 20-22 15-19 10-14 7-9 6         Occupational Therapy Evaluation Charge Determination   History Examination Decision-Making   LOW Complexity : Brief history review  LOW Complexity : 1-3 performance deficits relating to physical, cognitive , or psychosocial skils that result in activity limitations and / or participation restrictions  MEDIUM Complexity : Patient may present with comorbidities that affect occupational performnce. Miniml to moderate modification of tasks or assistance (eg, physical or verbal ) with assesment(s) is necessary to enable patient to complete evaluation       Based on the above components, the patient evaluation is determined to be of the following complexity level: LOW   Pain Ratin/10 abdomen    Activity Tolerance:   Fair    After treatment patient left in no apparent distress:    Sitting in chair and Call bell within reach    COMMUNICATION/EDUCATION:   The patients plan of care was discussed with: Physical therapist and Registered nurse. Patient/family have participated as able in goal setting and plan of care. and Patient/family agree to work toward stated goals and plan of care. This patients plan of care is appropriate for delegation to \[facility]\"". OT/PT sessions occurred together for increased patient and clinician safety at this time.      Thank you for this referral.  Umesh Sanchez  Time Calculation: 31 mins   Problem: Self Care Deficits Care Plan (Adult)  Goal: *Acute Goals and Plan of Care (Insert Text)  Description: Pt stated goal \"to get out of the bed\"  Pt will be IND sup <> sit in prep for EOB ADLs  Pt will be IND grooming standing sink side   Pt will be IND UB dressing sitting EOB/long sit   Pt will be IND LE dressing sitting EOB/long sit  Pt will be IND sit <>  prep for toileting   Pt will be IND toileting/toilet transfer/cloth mgmt   Pt will be IND following UE HEP in prep for self care tasks      Outcome: Not Met

## 2022-07-26 NOTE — PROGRESS NOTES
PHYSICAL THERAPY EVALUATION  Patient: Jil Tan (22 y.o. male)  Date: 7/26/2022  Primary Diagnosis: Cholecystitis [K81.9]  Acute cholecystitis [K81.0]  Procedure(s) (LRB):  CHOLECYSTECTOMY LAPAROSCOPIC CONVERTED TO OPEN CHOLECYSTECTOMY (N/A) 2 Days Post-Op   Precautions: falls       ASSESSMENT  Pt is a 51 yo male admitted on 7/22/2022 for as a transfer from [facility] for surgical consult and admission 2/2 cholecystitis. Per chart, pt reported 3-4 day hx of gradually worsening RUQ pain radiation to his back. CT abdomen/pelvis was consistent with acute cholecystitis with gallstones and gallbladder wall thickening/pericholecystic fluid. Pt is s/p laparoscopic procedure converted to open cholecystectomy with 19Fr drain placement with Dr. Samira Medellin on 7/24/22. Pt admitted to ICU following procedure for oxygen desaturation, currently on 4L O2 via NC (97% at rest). CXR was obtained given desaturation and possible concern of aspiration, notable only for atelectasis. New PT evaluation order placed 7/26/22 following transfer to ICU. PMH: none. Pt semi-supine in bed upon PT/OT arrival, NG suction, ISRRAEL drain, pitts catheter, O2 NC agreeable to evaluation. Pt A&O x 4. Per pt report, pt lives with spouse and children in a one-story trailer home with 5 SOSA and B HR, was IND with ADLs/IADLs working full time as a  for [employer] and and ambulatory without AD at [facility] [employer]. No DME owned at this time. Based on the objective data described below, the patient presents with generalized weakness, impaired functional mobility, impaired amb, impaired balance, and overall limited mobility due to NG tube suction. Pt required mod I for bed mobility, mod I supine > sit, SBA sit <> stand transfers. Pt amb 3 feet with HHA and SBA to CGA with line and lead management; demonstrating steady, step through gt pattern with no LOB or knee buckling noted.  Pt did well with session today with improved SpO2 following chair transfer from 92-94% at rest on 4L O2 (decreased to 2L by nsg prior to mobility) to 97% on 2L O2 via NC. Pt will benefit from continued skilled PT to address above deficits and return to PLOF. Current PT DC recommendation home with family care. Current Level of Function Impacting Discharge (mobility/balance): mod I to CGA    Other factors to consider for discharge: good family support, close to baseline, overall mobility limited due to       PLAN :  Recommendations and Planned Interventions: bed mobility training, transfer training, gait training, therapeutic exercises, patient and family training/education, and therapeutic activities      Frequency/Duration: Patient will be followed by physical therapy:  2-3x/week to address goals. Recommendation for discharge: (in order for the patient to meet his/her long term goals)  Home with Family Care    This discharge recommendation:  Has been made in collaboration with the attending provider and/or case management    IF patient discharges home will need the following DME: none         SUBJECTIVE:   Patient stated i'm feeling better.     OBJECTIVE DATA SUMMARY:   HISTORY:    History reviewed. No pertinent past medical history. No past surgical history on file. Home Situation  Home Environment: Trailer/mobile home  # Steps to Enter: 5  Rails to Enter: Yes  Hand Rails : Bilateral  One/Two Story Residence: One story  Living Alone: No  Support Systems: Spouse/Significant Other, Child(yanique)  Patient Expects to be Discharged to[de-identified] Home  Current DME Used/Available at Home: None  Tub or Shower Type: Shower (walk in shower)    EXAMINATION/PRESENTATION/DECISION MAKING:   Critical Behavior:  Neurologic State: Alert  Orientation Level: Oriented X4  Cognition: Follows commands     Hearing:   Auditory  Auditory Impairment: None  Skin:  ISRRAEL drain in place right LQ, incision not visualized   Edema: none noted  Range Of Motion:  AROM: Within functional limits Strength:    Strength: Within functional limits                    Tone & Sensation:                  Sensation: Intact               Coordination:  Coordination: Within functional limits  Vision:      Functional Mobility:  Bed Mobility:  Rolling: Modified independent  Supine to Sit: Modified independent     Scooting: Modified independent  Transfers:  Sit to Stand: Stand-by assistance  Stand to Sit: Stand-by assistance        Bed to Chair: Stand-by assistance              Balance:   Sitting: Intact; Without support  Standing: Intact; Without support  Ambulation/Gait Training:  Distance (ft): 3 Feet (ft)  Assistive Device: Other (comment) (assistance with lines required)  Ambulation - Level of Assistance: Stand-by assistance     Gait Description (WDL): Exceptions to [facility]           Base of Support: Narrowed     Speed/Starr: Slow         Therapeutic Exercises:   Not completed this session    Functional Measure:  [employer] [address] Mobility Inpatient Short Form  How much difficulty does the patient currently have. .. Unable A Lot A Little None   1. Turning over in bed (including adjusting bedclothes, sheets and blankets)? [] 1   [] 2   [] 3   [x] 4   2. Sitting down on and standing up from a chair with arms ( e.g., wheelchair, bedside commode, etc.)   [] 1   [] 2   [x] 3   [] 4   3. Moving from lying on back to sitting on the side of the bed? [] 1   [] 2   [] 3   [x] 4          How much help from another person does the patient currently need. .. Total A Lot A Little None   4. Moving to and from a bed to a chair (including a wheelchair)? [] 1   [] 2   [x] 3   [] 4   5. Need to walk in hospital room? [] 1   [] 2   [x] 3   [] 4   6. Climbing 3-5 steps with a railing? [] 1   [] 2   [x] 3   [] 4   © 2007, Trustees of [facility] [employer], under license to [employer].  All rights reserved     Score:  Initial: 20/24 Most Recent: X (Date: 7/26/22 )   Interpretation of Tool: Represents activities that are increasingly more difficult (i.e. Bed mobility, Transfers, Gait). Score 24 23 22-20 19-15 14-10 9-7 6   Modifier CH CI CJ CK CL CM CN         Physical Therapy Evaluation Charge Determination   History Examination Presentation Decision-Making   HIGH Complexity :3+ comorbidities / personal factors will impact the outcome/ POC  HIGH Complexity : 4+ Standardized tests and measures addressing body structure, function, activity limitation and / or participation in recreation  LOW Complexity : Stable, uncomplicated  Other outcome measures ampac 6  mod      Based on the above components, the patient evaluation is determined to be of the following complexity level: LOW     Pain Ratin/10 RLQ    Activity Tolerance:   Good and desaturates with exertion and requires oxygen    After treatment patient left in no apparent distress:   Sitting in chair and Call bell within reach and nsg updated. GOALS:    Problem: Mobility Impaired (Adult and Pediatric)  Goal: *Acute Goals and Plan of Care (Insert Text)  Description: Pt stated goal: to go home  Pt will be I with LE HEP in 7 days. Pt will perform transfers with mod I in 7 days. Pt will amb 100-200 feet with LRAD safely with mod I in 7 days. Pt will ascend/descend 4 steps with B handrails and CGA in 7 days to safely enter home. Outcome: Not Met       COMMUNICATION/EDUCATION:   The patients plan of care was discussed with: Occupational therapist, Registered nurse, and Case management. Fall prevention education was provided and the patient/caregiver indicated understanding., Patient/family have participated as able in goal setting and plan of care. , and Patient/family agree to work toward stated goals and plan of care. PT/OT sessions occurred together for increased safety of pt and clinician.        Thank you for this referral.  Donna Valero, PT, DPT   Time Calculation: 23 mins

## 2022-07-26 NOTE — PROGRESS NOTES
Spoke w/assigned RN re: patient remains in OBS status. Patient needs to be flipped from OBS to inpatient. Assigned RN to inform Dr. Magaly Long.           Ashly Velásquez, MSW

## 2022-07-26 NOTE — CONSULTS
Consult  Pulmonary, Critical Care    Name: Abhinav Mejia MRN: [id number]   : 1975 Hospital: [facility]   Date: 2022  Admission date: 2022 Hospital Day: 5       Subjective/Interval History:   Notified of consult late this morning not sure when it was ordered. Apparently he had oxygen desaturation during the night while sleeping and is currently on a 40% Ventimask. He denies any past medical history does state that he is known to be a severe snorer and in fact will wake himself up with snorting sort of snores. He has vivid dreams on a nightly basis. 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 RECOMMENDATIONS/PLAN:   Would be worthwhile to undergo outpatient sleep study once he is discharged  Have encouraged him to do self incentive spirometry hourly  Currently on nasal oxygen 2 L  Avoid narcotics particularly at night  Up in chair and walking as soon as possible           Subjective/Initial History:   I have reviewed the flowsheet and previous days notes. I was asked by Sumit Kim MD to see Abhinav Mejia a 52 y.o.  male  in consultation for a chief complaint of acute hypoxic respiratory failure          Patient PCP: None  PMH:  has no past medical history on file. PSH:   has no past surgical history on file. FHX: family history is not on file. SHX:  reports that he has never smoked. He has never used smokeless tobacco. He reports that he does not drink alcohol and does not use drugs. Systemic review:  General weight stable at home no chronic fever chills or sweats  Eyes no double vision or momentary blindness  Endocrinologic no polyuria polydipsia  Musculoskeletal no swollen tender joint  ENT no sinus congestion or drainage  Neurologic no seizures or syncope  Gastrointestinal acute onset abdominal pain at the time of admission with acute cholecystitis status post open cholecystectomy  Due to urinary no pain discomfort or bleeding on urination  Cardiovascular no history heart disease chest pain diaphoresis ankle edema or nocturia  Respiratory no chronic cough sputum production fever chills or sweats. He was a smoker stopped over 20 years ago no history of emphysema asthma. He does have severe snoring has been noted to have breath-holding spells at night with snorting type of respirations. Has vivid dreams almost every night.   Wakes up refreshed does not fall asleep during the daytime    No Known Allergies   MEDS:   Current Facility-Administered Medications   Medication    ketorolac (TORADOL) injection 15 mg    lactated Ringers infusion    piperacillin-tazobactam (ZOSYN) 3.375 g in 0.9% sodium chloride (MBP/ADV) 100 mL MBP    sodium chloride (NS) flush 5-40 mL    sodium chloride (NS) flush 5-40 mL    acetaminophen (TYLENOL) tablet 650 mg    Or    acetaminophen (TYLENOL) suppository 650 mg    polyethylene glycol (MIRALAX) packet 17 g    ondansetron (ZOFRAN ODT) tablet 4 mg    Or    ondansetron (ZOFRAN) injection 4 mg    *Please  patient's home medications from pharmacy at discharge*        Current Facility-Administered Medications:     ketorolac (TORADOL) injection 15 mg, 15 mg, IntraVENous, Q6H PRN, Ahsan Hayes MD, 15 mg at 07/26/22 0901    lactated Ringers infusion, 125 mL/hr, IntraVENous, Sebastián Garces MD, Last Rate: 125 mL/hr at 22 0804, 125 mL/hr at 22 0804    piperacillin-tazobactam (ZOSYN) 3.375 g in 0.9% sodium chloride (MBP/ADV) 100 mL MBP, 3.375 g, IntraVENous, Q8H, Ahsan Hayes MD, Last Rate: 25 mL/hr at 22 0804, 3.375 g at 22 0804    sodium chloride (NS) flush 5-40 mL, 5-40 mL, IntraVENous, Q8H, Arsenio Sutherland MD, 10 mL at 22 0719    sodium chloride (NS) flush 5-40 mL, 5-40 mL, IntraVENous, PRN, Arsenio Sutherland MD    acetaminophen (TYLENOL) tablet 650 mg, 650 mg, Oral, Q6H PRN **OR** acetaminophen (TYLENOL) suppository 650 mg, 650 mg, Rectal, Q6H PRN, Arsenio Sutherland MD    polyethylene glycol (MIRALAX) packet 17 g, 17 g, Oral, DAILY PRN, Arsenio Sutherland MD, 17 g at 22 1337    ondansetron (ZOFRAN ODT) tablet 4 mg, 4 mg, Oral, Q8H PRN **OR** ondansetron (ZOFRAN) injection 4 mg, 4 mg, IntraVENous, Q6H PRN, Arsenio Sutherland MD, 4 mg at 22 2049    *Please  patient's home medications from pharmacy at discharge*, 1 Each, Other, PRIOR TO DISCHARGE, Arsenio Sutherland MD      Objective:     Vital Signs: Telemetry:    normal sinus rhythm Intake/Output:   Visit Vitals  /86 (BP 1 Location: Right upper arm, BP Patient Position: At rest)   Pulse 98   Temp 98.3 °F (36.8 °C)   Resp 10   Ht 5' 10.98\" (1.803 m)   Wt 103.9 kg (229 lb)   SpO2 99%   BMI 31.95 kg/m²       Temp (24hrs), Av.3 °F (36.8 °C), Min:97.8 °F (36.6 °C), Max:99.4 °F (37.4 °C)        O2 Device: Nasal cannula O2 Flow Rate (L/min): 4 l/min         Body mass index is 31.95 kg/m².     Wt Readings from Last 4 Encounters:   22 103.9 kg (229 lb)   22 103.9 kg (229 lb)   22 103.9 kg (229 lb)          Intake/Output Summary (Last 24 hours) at 2022 0938  Last data filed at 2022 0926  Gross per 24 hour   Intake 1514.58 ml   Output 2815 ml   Net -1300.42 ml         Last shift:       07 -  1900  In: 1214.6 [I.V.:1214.6]  Out: 80 [Drains:80]  Last 3 shifts: 07/24 1901 - 07/26 0700  In: 300 [I.V.:300]  Out: 4165 [Urine:2525; Drains:240]       Hemodynamics:    CO:    CI:    CVP:    SVR:   PAP Systolic:    PAP Diastolic:    PVR:    DR78:       Ventilator Settings:      Mode Rate TV Press PEEP FiO2 PIP Min. Vent               50 %            Physical Exam:    General:  male; lying in bed on cell oxygen no distress no accessory muscle usage  HEENT: NCAT, normal oral mucosa  Eyes: anicteric; conjunctiva clear extraocular movements intact  Neck: no nodes, overweight status obscures determination of JVD no accessory MM use. Chest: no deformity,   Cardiac: Regular rate and rhythm  Lungs: Clear anteriorly and laterally with chest a few rales posteriorly  Abd: Slightly distended mildly tender bowel sounds present  Ext: no edema; no joint swelling; No clubbing  : clear urine  Neuro: Awake alert oriented speech is clear moves all 4 extremities  Psych- no agitation, oriented to person;   Skin: warm, dry, no cyanosis;   Pulses: Brachial and radial pulses intact  Capillary: Normal capillary refill      Labs:    Recent Labs     07/26/22 0450 07/25/22  1135 07/24/22  1911   WBC 10.5 15.4* 8.9   HGB 9.8* 12.0* 11.6*    263 241       Recent Labs     07/26/22 0450 07/25/22  1135 07/24/22  0625     --   --    K 3.6  --   --      --   --    CO2 23  --   --    GLU 70  --   --    BUN 10  --   --    CREA 0.72  --   --    CA 7.5*  --   --    PHOS 1.6*  --   --    ALB 2.2* 3.1* 3.2*   ALT  --  98* 67   LPSE  --   --  238           Imaging:  I have personally reviewed the patients radiographs and have reviewed the reports:    CXR Results  (Last 48 hours)                 07/26/22 0308  XR CHEST PORT Final result    Impression:  1. Pulmonary interstitial edema. Trace left pleural effusion and left basilar   atelectasis. 2. More focal left lower lobe retrocardiac opacity may represent   aspiration/infection versus atelectasis. Narrative:  EXAM:  XR CHEST PORT       INDICATION:   Atelectasis       COMPARISON: Chest radiograph 7/25/2022. FINDINGS: AP radiograph of the chest was obtained. Gastric tube coursing into the stomach out of field of view. Mild diffuse   bilateral interstitial opacities with Kerley B lines noted. Trace left pleural   effusion with left basilar atelectasis. More focal heterogeneous retrocardiac   left lower lobe opacity. No pneumothorax. Heart size is within normal limits. No   acute osseous abnormalities. 07/25/22 1205  XR CHEST PORT Final result    Impression:      Increasing right upper lobe subsegmental atelectasis. Persistent left lower lobe subsegmental atelectasis. Narrative:  EXAM: XR CHEST PORT       INDICATION: Acute hypoxic respiratory failure       COMPARISON: 7/24/2022       FINDINGS: A portable AP radiograph of the chest was obtained at 1147 hours. The   patient is on a cardiac monitor. The NG tube extends below the hemidiaphragm. There is linear subsegmental atelectasis at the left lung base, unchanged. Linear subsegmental atelectasis at the right lung base appears to have   increased. The cardiac and mediastinal contours and pulmonary vascularity are   stable. The bones and soft tissues are grossly within normal limits. 07/24/22 2149  XR CHEST PORT Final result    Impression:  Shallow volumes and bibasilar atelectasis. Narrative:  PORTABLE CHEST RADIOGRAPH/S: 7/24/2022 9:49 PM       INDICATION: Aspiration. COMPARISON: None. TECHNIQUE: Portable frontal upright radiograph/s of the chest.       FINDINGS:    The lungs are hypoinflated and there is bibasilar atelectasis. The central   airways are patent. No pneumothorax and no large pleural effusion. An NG tube   extends below the hemidiaphragm.                   Results from [place] encounter on 07/22/22    XR CHEST PORT    Narrative  EXAM:  XR CHEST PORT    INDICATION: Atelectasis    COMPARISON: Chest radiograph 7/25/2022. FINDINGS: AP radiograph of the chest was obtained. Gastric tube coursing into the stomach out of field of view. Mild diffuse  bilateral interstitial opacities with Kerley B lines noted. Trace left pleural  effusion with left basilar atelectasis. More focal heterogeneous retrocardiac  left lower lobe opacity. No pneumothorax. Heart size is within normal limits. No  acute osseous abnormalities. Impression  1. Pulmonary interstitial edema. Trace left pleural effusion and left basilar  atelectasis. 2. More focal left lower lobe retrocardiac opacity may represent  aspiration/infection versus atelectasis. XR CHEST PORT    Narrative  EXAM: XR CHEST PORT    INDICATION: Acute hypoxic respiratory failure    COMPARISON: 7/24/2022    FINDINGS: A portable AP radiograph of the chest was obtained at 1147 hours. The  patient is on a cardiac monitor. The NG tube extends below the hemidiaphragm. There is linear subsegmental atelectasis at the left lung base, unchanged. Linear subsegmental atelectasis at the right lung base appears to have  increased. The cardiac and mediastinal contours and pulmonary vascularity are  stable. The bones and soft tissues are grossly within normal limits. Impression  Increasing right upper lobe subsegmental atelectasis. Persistent left lower lobe subsegmental atelectasis. XR CHEST PORT    Narrative  PORTABLE CHEST RADIOGRAPH/S: 7/24/2022 9:49 PM    INDICATION: Aspiration. COMPARISON: None. TECHNIQUE: Portable frontal upright radiograph/s of the chest.    FINDINGS:  The lungs are hypoinflated and there is bibasilar atelectasis. The central  airways are patent. No pneumothorax and no large pleural effusion. An NG tube  extends below the hemidiaphragm. Impression  Shallow volumes and bibasilar atelectasis.     Results from [place] encounter on 07/20/22    CT ABD PELV WO CONT    Narrative  EXAM: CT of the abdomen and pelvis    INDICATION: Pain. COMPARISON: None. TECHNIQUE: Axial CT imaging of the abdomen and pelvis was performed without  intravenous contrast. Multiplanar reformats were generated. Dose reduction  techniques used: automated exposure control, adjustment of the mAs and/or kVp  according to patient size, and iterative reconstruction techniques. Digital  imaging and communications in Medicine (DICOM) format image data are available  to nonaffiliated external healthcare facilities or entities on a secure, media  free, reciprocally searchable basis with patient authorization for at least 12  months after this study. _______________    FINDINGS:    LOWER CHEST: Unremarkable. LIVER, BILIARY: Liver is normal. No biliary dilation. Gallstones are noted. There is gallbladder wall thickening/pericholecystic fluid. PANCREAS: Normal.    SPLEEN: Normal.    ADRENALS: Normal.    KIDNEYS: Normal.    LYMPH NODES: No enlarged lymph nodes. GASTROINTESTINAL TRACT: No bowel dilation or wall thickening. The appendix is  visualized and is within normal limits. PELVIC ORGANS: Unremarkable. VASCULATURE: Unremarkable. BONES: No acute or aggressive osseous abnormalities identified. OTHER: None.    _______________    Impression  Gallstones with gallbladder wall thickening/pericholecystic fluid. The  gallbladder disease. No other significant abnormality seen. Discussion history is suspicious for obstructive sleep apnea and he would benefit from outpatient testing once he is discharged. At the current time he has significant atelectasis on the chest x-ray and I have encouraged him to do self incentive spirometry hourly. Bed rest pain medications and atelectasis could be the entire cause of his hypoxia. Will supplement as needed while he is hospitalized  Discussed with the patient and answered all questions  7/26 received fentanyl at 2200 hrs.  3 AM 7 AM and developed obstructed airway signs with severe oxygen desaturation. He states Toradol did not entirely relieve his pain. Hopefully he is far enough out from surgery that the pain will start decreasing on its own. Phosphorus is low will replete tube  Time of care 30 minutes         Thank you for allowing us to participate in the care of this patient.   We will follow along with you     Crys Patton MD

## 2022-07-26 NOTE — PROGRESS NOTES
Attempted to page and call Dr. Azul Adams in regards to switching pt to inpatient status. Awaiting a call back.

## 2022-07-27 ENCOUNTER — APPOINTMENT (OUTPATIENT)
Dept: GENERAL RADIOLOGY | Age: 47
DRG: 414 | End: 2022-07-27
Attending: INTERNAL MEDICINE
Payer: COMMERCIAL

## 2022-07-27 LAB
ALBUMIN SERPL-MCNC: 2.3 G/DL (ref 3.5–5)
ANION GAP SERPL CALC-SCNC: 8 MMOL/L (ref 5–15)
BASOPHILS # BLD: 0 K/UL (ref 0–0.1)
BASOPHILS NFR BLD: 0 % (ref 0–1)
BUN SERPL-MCNC: 8 MG/DL (ref 6–20)
BUN/CREAT SERPL: 12 (ref 12–20)
CA-I BLD-MCNC: 7.9 MG/DL (ref 8.5–10.1)
CHLORIDE SERPL-SCNC: 104 MMOL/L (ref 97–108)
CO2 SERPL-SCNC: 28 MMOL/L (ref 21–32)
CREAT SERPL-MCNC: 0.67 MG/DL (ref 0.7–1.3)
DIFFERENTIAL METHOD BLD: ABNORMAL
EOSINOPHIL # BLD: 0.3 K/UL (ref 0–0.4)
EOSINOPHIL NFR BLD: 4 % (ref 0–7)
ERYTHROCYTE [DISTWIDTH] IN BLOOD BY AUTOMATED COUNT: 11.9 % (ref 11.5–14.5)
GLUCOSE SERPL-MCNC: 89 MG/DL (ref 65–100)
HCT VFR BLD AUTO: 28.1 % (ref 36.6–50.3)
HGB BLD-MCNC: 9.7 G/DL (ref 12.1–17)
IMM GRANULOCYTES # BLD AUTO: 0 K/UL (ref 0–0.04)
IMM GRANULOCYTES NFR BLD AUTO: 0 % (ref 0–0.5)
LYMPHOCYTES # BLD: 1.1 K/UL (ref 0.8–3.5)
LYMPHOCYTES NFR BLD: 14 % (ref 12–49)
MAGNESIUM SERPL-MCNC: 2 MG/DL (ref 1.6–2.4)
MCH RBC QN AUTO: 31.1 PG (ref 26–34)
MCHC RBC AUTO-ENTMCNC: 34.5 G/DL (ref 30–36.5)
MCV RBC AUTO: 90.1 FL (ref 80–99)
MONOCYTES # BLD: 0.5 K/UL (ref 0–1)
MONOCYTES NFR BLD: 7 % (ref 5–13)
NEUTS SEG # BLD: 5.6 K/UL (ref 1.8–8)
NEUTS SEG NFR BLD: 75 % (ref 32–75)
NRBC # BLD: 0 K/UL (ref 0–0.01)
NRBC BLD-RTO: 0 PER 100 WBC
PHOSPHATE SERPL-MCNC: 2.5 MG/DL (ref 2.6–4.7)
PLATELET # BLD AUTO: 212 K/UL (ref 150–400)
PMV BLD AUTO: 9 FL (ref 8.9–12.9)
POTASSIUM SERPL-SCNC: 3.3 MMOL/L (ref 3.5–5.1)
RBC # BLD AUTO: 3.12 M/UL (ref 4.1–5.7)
SODIUM SERPL-SCNC: 140 MMOL/L (ref 136–145)
WBC # BLD AUTO: 7.5 K/UL (ref 4.1–11.1)

## 2022-07-27 PROCEDURE — 74011000250 HC RX REV CODE- 250: Performed by: SURGERY

## 2022-07-27 PROCEDURE — 74011250637 HC RX REV CODE- 250/637: Performed by: INTERNAL MEDICINE

## 2022-07-27 PROCEDURE — G0378 HOSPITAL OBSERVATION PER HR: HCPCS

## 2022-07-27 PROCEDURE — 36415 COLL VENOUS BLD VENIPUNCTURE: CPT

## 2022-07-27 PROCEDURE — 85025 COMPLETE CBC W/AUTO DIFF WBC: CPT

## 2022-07-27 PROCEDURE — 94762 N-INVAS EAR/PLS OXIMTRY CONT: CPT

## 2022-07-27 PROCEDURE — 74011000258 HC RX REV CODE- 258: Performed by: SURGERY

## 2022-07-27 PROCEDURE — 80069 RENAL FUNCTION PANEL: CPT

## 2022-07-27 PROCEDURE — 71045 X-RAY EXAM CHEST 1 VIEW: CPT

## 2022-07-27 PROCEDURE — 65270000029 HC RM PRIVATE

## 2022-07-27 PROCEDURE — 74011250636 HC RX REV CODE- 250/636: Performed by: SURGERY

## 2022-07-27 PROCEDURE — 74011250637 HC RX REV CODE- 250/637: Performed by: SURGERY

## 2022-07-27 PROCEDURE — 83735 ASSAY OF MAGNESIUM: CPT

## 2022-07-27 PROCEDURE — 99223 1ST HOSP IP/OBS HIGH 75: CPT | Performed by: INTERNAL MEDICINE

## 2022-07-27 RX ORDER — POTASSIUM CHLORIDE 750 MG/1
40 TABLET, FILM COATED, EXTENDED RELEASE ORAL
Status: COMPLETED | OUTPATIENT
Start: 2022-07-27 | End: 2022-07-27

## 2022-07-27 RX ORDER — SODIUM,POTASSIUM PHOSPHATES 280-250MG
2 POWDER IN PACKET (EA) ORAL EVERY 4 HOURS
Status: COMPLETED | OUTPATIENT
Start: 2022-07-27 | End: 2022-07-27

## 2022-07-27 RX ADMIN — SODIUM CHLORIDE, PRESERVATIVE FREE 10 ML: 5 INJECTION INTRAVENOUS at 21:04

## 2022-07-27 RX ADMIN — POTASSIUM & SODIUM PHOSPHATES POWDER PACK 280-160-250 MG 2 PACKET: 280-160-250 PACK at 16:27

## 2022-07-27 RX ADMIN — POTASSIUM CHLORIDE 40 MEQ: 750 TABLET, FILM COATED, EXTENDED RELEASE ORAL at 11:10

## 2022-07-27 RX ADMIN — POTASSIUM & SODIUM PHOSPHATES POWDER PACK 280-160-250 MG 2 PACKET: 280-160-250 PACK at 11:09

## 2022-07-27 RX ADMIN — ACETAMINOPHEN 650 MG: 325 TABLET, FILM COATED ORAL at 11:10

## 2022-07-27 RX ADMIN — SODIUM CHLORIDE, PRESERVATIVE FREE 10 ML: 5 INJECTION INTRAVENOUS at 06:05

## 2022-07-27 RX ADMIN — SODIUM CHLORIDE, POTASSIUM CHLORIDE, SODIUM LACTATE AND CALCIUM CHLORIDE 125 ML/HR: 600; 310; 30; 20 INJECTION, SOLUTION INTRAVENOUS at 00:33

## 2022-07-27 RX ADMIN — PIPERACILLIN AND TAZOBACTAM 3.38 G: 3; .375 INJECTION, POWDER, FOR SOLUTION INTRAVENOUS at 08:17

## 2022-07-27 RX ADMIN — PIPERACILLIN AND TAZOBACTAM 3.38 G: 3; .375 INJECTION, POWDER, FOR SOLUTION INTRAVENOUS at 00:31

## 2022-07-27 RX ADMIN — PIPERACILLIN AND TAZOBACTAM 3.38 G: 3; .375 INJECTION, POWDER, FOR SOLUTION INTRAVENOUS at 16:27

## 2022-07-27 RX ADMIN — SODIUM CHLORIDE, PRESERVATIVE FREE 10 ML: 5 INJECTION INTRAVENOUS at 14:00

## 2022-07-27 NOTE — CONSULTS
Spoke with Dr. Vega Fields regarding transferring pt out of ICU, telephone order received to transfer pt to other floor other than 5e.

## 2022-07-27 NOTE — PROGRESS NOTES
S/P laparoscopic converted to open cholecystectomy, POD#3    Subjective:  Mr. Ghassan Marrero is a 52y.o. year old * male admitted for acute cholecystitis s/p Open cholecystectomy 07/25/22. Patient reports feeling well today. Pain is well controlled. No fever, nausea or vomiting. Pt able to ambulate and perform exercises without difficulty. Passing flatus, no BM yet. No fevers or SOB. Patient + for COVID-19. WBC within normal limits. Review of Systems:   Constitutional:  no fever, no chills,  no sweats, No weakness, No fatigue, No decreased activity. Respiratory: No shortness of breath, No cough, No sputum production, No hemoptysis, No wheezing, No cyanosis. Cardiovascular: No chest pain, No palpitations, No bradycardia, No tachycardia, No peripheral edema, No syncope. Gastrointestinal: No nausea, No vomiting, No diarrhea, No constipation, No heartburn, No abdominal pain. Genitourinary: No dysuria, No hematuria, No change in urine stream, No urethral discharge, No lesions. Hematology/Lymphatics: No bruising tendency, No bleeding tendency, No petechiae, No swollen lymph glands. Endocrine: No excessive thirst, No polyuria, No cold intolerance, No heat intolerance, No excessive hunger. Musculoskeletal: No back pain, No neck pain, No joint pain, No muscle pain, No claudication, No decreased range of motion, No trauma. Integumentary: No rash, No pruritus, No abrasions. Neurologic: Alert and oriented X4, No abnormal balance, No headache, No confusion, No numbness, No tingling. Psychiatric: No anxiety, No depression, No mookie. Physical Exam:     Vitals & Measurements:     Wt Readings from Last 3 Encounters:   07/22/22 103.9 kg (229 lb)   07/21/22 103.9 kg (229 lb)   07/20/22 103.9 kg (229 lb)     Temp Readings from Last 3 Encounters:   07/27/22 98.4 °F (36.9 °C)   07/21/22 98.5 °F (36.9 °C)   07/20/22 97.6 °F (36.4 °C)     BP Readings from Last 3 Encounters:   07/27/22 127/86   07/22/22 (!) 163/93   07/21/22 (!) 152/100     Pulse Readings from Last 3 Encounters:   07/27/22 100   07/22/22 78   07/21/22 84      Ht Readings from Last 3 Encounters:   07/25/22 5' 10.98\" (1.803 m)   07/21/22 5' 11\" (1.803 m)   07/20/22 5' 11\" (1.803 m)      Date 07/26/22 0700 - 07/27/22 0659 07/27/22 0700 - 07/28/22 0659   Shift 5538-5706 4192-5049 24 Hour Total 0254-4231 5928-2419 24 Hour Total   INTAKE   I.V.(mL/kg/hr) 3688.6(6.3)  2283.3(0.9)        Volume (lactated Ringers infusion) 2083. 3  2083. 3        Volume (piperacillin-tazobactam (ZOSYN) 3.375 g in 0.9% sodium chloride (MBP/ADV) 100 mL MBP) 200  200      NG/  110        Medication Volume ([REMOVED] Nasogastric Tube 07/24/22) 110  110      Shift Total(mL/kg) 9078.8(87)  5810.3(60)      OUTPUT   Urine(mL/kg/hr) 500(0.4) 325(0.3) 825(0.3)        Urine Voided 100 325 425        Urine Occurrence(s) 1 x 4 x 5 x        Urine Output (mL) ([REMOVED] Urinary Catheter 07/24/22 2- way; [place]) 400  400      Emesis/NG output 0  0        Output (ml) ([REMOVED] Nasogastric Tube 07/24/22) 0  0      Drains 110 150 260        Output (ml) ([place] Drain 07/24/22 Anterior;Mid Abdomen) 110 150 260      Stool           Stool Occurrence(s) 2 x 4 x 6 x      Shift Total(mL/kg) 610(5.9) 475(4.6) 1085(10.4)      NET 1783.3 -475 1308. 3      Weight (kg) 103.9 103.9 103.9 103.9 103.9 103.9       General: well appearing, no acute distress  Head: Normal  Face: Nornal  HEENT: atraumatic, PERRLA, moist mucosa, normal pharynx, normal tonsils and adenoids, normal tongue, no fluid in sinuses. Neck: Trachea midline, no carotid bruit, no masses  Chest: Normal.  Respiratory: normal chest wall expansion, CTA B, no r/r/w, no rubs  Cardiovascular: Tachycardic to 106 at bedside, regular rhythm, no m/r/g, Normal S1 and S2  Abdomen: Soft, non tender, non-distended, normal bowel sounds in all quadrants, no hepatosplenomegaly, no tympany. Incision scar: dressings intact over incision sites. Appropriate tenderness near incision sites. +ISRRAEL drain with serous & minimal output. Genitourinary: No inguinal hernia, normal external gentalia, Testis & scrotum normal, no renal angle tenderness. Rectal: deferred  Musculoskeletal: Normal ROM in upper and lower extremities, No joint swelling. Integumentary: Warm, dry, and pink, with no rash, purpura, or petechia  Heme/Lymph: No lymphadenopathy, no bruises  Neurological: Cranial Nerves II-XII grossly intact, No gross sensory or motor deficit. Psychiatric: Cooperative with normal mood, affect, and cognition    Laboratory Values:   Recent Results (from the past 24 hour(s))   COVID-19 RAPID TEST    Collection Time: 07/26/22  6:24 PM   Result Value Ref Range    COVID-19 rapid test DETECTED (A) Not Detected     RENAL FUNCTION PANEL    Collection Time: 07/27/22  4:30 AM   Result Value Ref Range    Sodium 140 136 - 145 mmol/L    Potassium 3.3 (L) 3.5 - 5.1 mmol/L    Chloride 104 97 - 108 mmol/L    CO2 28 21 - 32 mmol/L    Anion gap 8 5 - 15 mmol/L    Glucose 89 65 - 100 mg/dL    BUN 8 6 - 20 mg/dL    Creatinine 0.67 (L) 0.70 - 1.30 mg/dL    BUN/Creatinine ratio 12 12 - 20      GFR est AA >60 >60 ml/min/1.73m2    GFR est non-AA >60 >60 ml/min/1.73m2    Calcium 7.9 (L) 8.5 - 10.1 mg/dL    Phosphorus 2.5 (L) 2.6 - 4.7 mg/dL    Albumin 2.3 (L) 3.5 - 5.0 g/dL   CBC WITH AUTOMATED DIFF    Collection Time: 07/27/22  4:30 AM   Result Value Ref Range    WBC 7.5 4.1 - 11.1 K/uL    RBC 3.12 (L) 4.10 - 5.70 M/uL    HGB 9.7 (L) 12.1 - 17.0 g/dL    HCT 28.1 (L) 36.6 - 50.3 %    MCV 90.1 80.0 - 99.0 FL    MCH 31.1 26.0 - 34.0 PG    MCHC 34.5 30.0 - 36.5 g/dL    RDW 11.9 11.5 - 14.5 %    PLATELET 116 298 - 698 K/uL    MPV 9.0 8.9 - 12.9 FL    NRBC 0.0 0.0  WBC    ABSOLUTE NRBC 0.00 0.00 - 0.01 K/uL    NEUTROPHILS 75 32 - 75 %    LYMPHOCYTES 14 12 - 49 %    MONOCYTES 7 5 - 13 %    EOSINOPHILS 4 0 - 7 %    BASOPHILS 0 0 - 1 %    IMMATURE GRANULOCYTES 0 0 - 0.5 %    ABS. NEUTROPHILS 5.6 1.8 - 8.0 K/UL    ABS. LYMPHOCYTES 1.1 0.8 - 3.5 K/UL    ABS. MONOCYTES 0.5 0.0 - 1.0 K/UL    ABS. EOSINOPHILS 0.3 0.0 - 0.4 K/UL    ABS. BASOPHILS 0.0 0.0 - 0.1 K/UL    ABS. IMM. GRANS. 0.0 0.00 - 0.04 K/UL    DF AUTOMATED     MAGNESIUM    Collection Time: 07/27/22  4:30 AM   Result Value Ref Range    Magnesium 2.0 1.6 - 2.4 mg/dL           XR CHEST PORT   Final Result   Shallow volumes and bibasilar atelectasis. XR CHEST PORT   Final Result   1. Pulmonary interstitial edema. Trace left pleural effusion and left basilar   atelectasis. 2. More focal left lower lobe retrocardiac opacity may represent   aspiration/infection versus atelectasis. XR CHEST PORT   Final Result      Increasing right upper lobe subsegmental atelectasis. Persistent left lower lobe subsegmental atelectasis. XR CHEST PORT   Final Result   Shallow volumes and bibasilar atelectasis. XR FLUOROSCOPY UNDER 60 MINUTES   Final Result            Assessment:  Problem List Items Addressed This Visit          Digestive    Cholecystitis - Primary      S/P laparoscopic converted to open cholecystectomy 07/24/22, POD #3    Plan:    Admission: transferred to 5W  Diet: soft diet  IV fluids  SCD  IS  Pain medications: Toradol. Antibiotics: Zosyn  Nausea medication  Labs in AM  PT   Supplemental O2 discontinued. Patient well appearing on RA. Appreciate ID consult given + COVID-19. Will continue to monitor patient's post-op status  Possible d/c in am with ISRRAEL drain if OK with ID. Thank you for allowing me to participate in the care of this patient.

## 2022-07-27 NOTE — PROGRESS NOTES
Consult  Pulmonary, Critical Care    Name: Inna Newman MRN: [id number]   : 1975 Hospital: [facility]   Date: 2022  Admission date: 2022 Hospital Day: 6       Subjective/Interval History:   Notified of consult late this morning not sure when it was ordered. Apparently he had oxygen desaturation during the night while sleeping and is currently on a 40% Ventimask. He denies any past medical history does state that he is known to be a severe snorer and in fact will wake himself up with snorting sort of snores. He has vivid dreams on a nightly basis. 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 RECOMMENDATIONS/PLAN:   Would be worthwhile to undergo outpatient sleep study once he is discharged  Have encouraged him to do self incentive spirometry hourly  Discontinue oxygen  Replace potassium and phosphorus  Avoid narcotics particularly at night           Subjective/Initial History:   I have reviewed the flowsheet and previous days notes. I was asked by Joseph Hathaway MD to see Inna Newman a 52 y.o.  male  in consultation for a chief complaint of acute hypoxic respiratory failure          Patient PCP: None  PMH:  has no past medical history on file.   PSH:   has no past surgical history on file. FHX: family history is not on file. SHX:  reports that he has never smoked. He has never used smokeless tobacco. He reports that he does not drink alcohol and does not use drugs. Systemic review:  General weight stable at home no chronic fever chills or sweats  Eyes no double vision or momentary blindness  Endocrinologic no polyuria polydipsia  Musculoskeletal no swollen tender joint  ENT no sinus congestion or drainage  Neurologic no seizures or syncope  Gastrointestinal acute onset abdominal pain at the time of admission with acute cholecystitis status post open cholecystectomy  Due to urinary no pain discomfort or bleeding on urination  Cardiovascular no history heart disease chest pain diaphoresis ankle edema or nocturia  Respiratory no chronic cough sputum production fever chills or sweats. He was a smoker stopped over 20 years ago no history of emphysema asthma. He does have severe snoring has been noted to have breath-holding spells at night with snorting type of respirations. Has vivid dreams almost every night.   Wakes up refreshed does not fall asleep during the daytime    Allergies   Allergen Reactions    Opioids - Morphine Analogues Other (comments)     Patient becomes apneic with high dose narcotics (hydromorphone 2mg, fentanyl 75 mcg)       MEDS:   Current Facility-Administered Medications   Medication    potassium chloride SR (KLOR-CON 10) tablet 40 mEq    ketorolac (TORADOL) injection 15 mg    lactated Ringers infusion    piperacillin-tazobactam (ZOSYN) 3.375 g in 0.9% sodium chloride (MBP/ADV) 100 mL MBP    sodium chloride (NS) flush 5-40 mL    sodium chloride (NS) flush 5-40 mL    acetaminophen (TYLENOL) tablet 650 mg    Or    acetaminophen (TYLENOL) suppository 650 mg    polyethylene glycol (MIRALAX) packet 17 g    ondansetron (ZOFRAN ODT) tablet 4 mg    Or    ondansetron (ZOFRAN) injection 4 mg    *Please  patient's home medications from pharmacy at discharge*        Current Facility-Administered Medications:     potassium chloride SR (KLOR-CON 10) tablet 40 mEq, 40 mEq, Oral, NOW, Tracy Linton MD    ketorolac (TORADOL) injection 15 mg, 15 mg, IntraVENous, Q6H PRN, Ahsan Hayes MD, 15 mg at 22    lactated Ringers infusion, 100 mL/hr, IntraVENous, CONTINUOUS, Georgette Hayes MD, Last Rate: 125 mL/hr at 22 0033, 125 mL/hr at 22 0033    piperacillin-tazobactam (ZOSYN) 3.375 g in 0.9% sodium chloride (MBP/ADV) 100 mL MBP, 3.375 g, IntraVENous, Q8H, Ahsan Hayes MD, Last Rate: 25 mL/hr at 22 0817, 3.375 g at 22 0817    sodium chloride (NS) flush 5-40 mL, 5-40 mL, IntraVENous, Q8H, Abdifatah Sutherland MD, 10 mL at 22 3238    sodium chloride (NS) flush 5-40 mL, 5-40 mL, IntraVENous, PRN, Abdifatah Sutherland MD    acetaminophen (TYLENOL) tablet 650 mg, 650 mg, Oral, Q6H PRN, 650 mg at 22 **OR** acetaminophen (TYLENOL) suppository 650 mg, 650 mg, Rectal, Q6H PRN, Abdifatah Sutherland MD    polyethylene glycol (MIRALAX) packet 17 g, 17 g, Oral, DAILY PRN, Abdifatah Sutherland MD, 17 g at 22 1337    ondansetron (ZOFRAN ODT) tablet 4 mg, 4 mg, Oral, Q8H PRN **OR** ondansetron (ZOFRAN) injection 4 mg, 4 mg, IntraVENous, Q6H PRN, Abdifatah Sutherland MD, 4 mg at 22    *Please  patient's home medications from pharmacy at discharge*, 1 Each, Other, PRIOR TO DISCHARGE, Abdifatah Sutherland MD      Objective:     Vital Signs: Telemetry:    normal sinus rhythm Intake/Output:   Visit Vitals  /77   Pulse 95   Temp 97.7 °F (36.5 °C)   Resp 26   Ht 5' 10.98\" (1.803 m)   Wt 103.9 kg (229 lb)   SpO2 96%   BMI 31.95 kg/m²       Temp (24hrs), Av.1 °F (36.7 °C), Min:97.7 °F (36.5 °C), Max:98.6 °F (37 °C)        O2 Device: None (Room air) O2 Flow Rate (L/min): 2 l/min         Body mass index is 31.95 kg/m².     Wt Readings from Last 4 Encounters:   22 103.9 kg (229 lb)   22 103.9 kg (229 lb)   07/20/22 103.9 kg (229 lb)          Intake/Output Summary (Last 24 hours) at 7/27/2022 1023  Last data filed at 7/27/2022 0600  Gross per 24 hour   Intake 1178.75 ml   Output 1005 ml   Net 173.75 ml         Last shift:      No intake/output data recorded. Last 3 shifts: 07/25 1901 - 07/27 0700  In: 2393.3 [I.V.:2283.3]  Out: 4093 [Urine:1325; Drains:460]       Hemodynamics:    CO:    CI:    CVP:    SVR:   PAP Systolic:    PAP Diastolic:    PVR:    XA53:       Ventilator Settings:      Mode Rate TV Press PEEP FiO2 PIP Min. Vent               50 %            Physical Exam:    General:  male; up walking in the room with no distress on room air saturation 98%  HEENT: NCAT, normal oral mucosa  Eyes: anicteric; conjunctiva clear extraocular movements intact  Neck: no nodes, overweight status obscures determination of JVD no accessory MM use. Chest: no deformity,   Cardiac: Regular rate and rhythm  Lungs: Clear anteriorly and laterally with slightly diminished breath sounds in the left base  Abd: Slightly distended mildly tender bowel sounds present  Ext: no edema; no joint swelling;  No clubbing  : clear urine  Neuro: Awake alert oriented speech is clear moves all 4 extremities  Psych- no agitation, oriented to person;   Skin: warm, dry, no cyanosis;   Pulses: Brachial and radial pulses intact  Capillary: Normal capillary refill      Labs:    Recent Labs     07/27/22  0430 07/26/22  0450 07/25/22  1135   WBC 7.5 10.5 15.4*   HGB 9.7* 9.8* 12.0*    212 263       Recent Labs     07/27/22  0430 07/26/22  0450 07/25/22  1135    139  --    K 3.3* 3.6  --     104  --    CO2 28 23  --    GLU 89 70  --    BUN 8 10  --    CREA 0.67* 0.72  --    CA 7.9* 7.5*  --    MG 2.0  --   --    PHOS 2.5* 1.6*  --    ALB 2.3* 2.2* 3.1*   ALT  --   --  98*           Imaging:  I have personally reviewed the patients radiographs and have reviewed the reports:    CXR Results  (Last 48 hours) 07/27/22 0241  XR CHEST PORT Final result    Impression:  Shallow volumes and bibasilar atelectasis. Narrative:  PORTABLE CHEST RADIOGRAPH/S: 7/27/2022 2:41 AM       INDICATION: Atelectasis. COMPARISON: 7/26/2022, 7/24/2022; CT abdomen pelvis 7/20/2022. TECHNIQUE: Portable frontal upright radiograph/s of the chest.       FINDINGS:    The lungs are hypoinflated, and there is atelectasis in the right middle lobe,   lingula, and bases. The central airways are patent. No pneumothorax and no large   pleural effusion. 07/26/22 0308  XR CHEST PORT Final result    Impression:  1. Pulmonary interstitial edema. Trace left pleural effusion and left basilar   atelectasis. 2. More focal left lower lobe retrocardiac opacity may represent   aspiration/infection versus atelectasis. Narrative:  EXAM:  XR CHEST PORT       INDICATION:   Atelectasis       COMPARISON: Chest radiograph 7/25/2022. FINDINGS: AP radiograph of the chest was obtained. Gastric tube coursing into the stomach out of field of view. Mild diffuse   bilateral interstitial opacities with Kerley B lines noted. Trace left pleural   effusion with left basilar atelectasis. More focal heterogeneous retrocardiac   left lower lobe opacity. No pneumothorax. Heart size is within normal limits. No   acute osseous abnormalities. 07/25/22 1205  XR CHEST PORT Final result    Impression:      Increasing right upper lobe subsegmental atelectasis. Persistent left lower lobe subsegmental atelectasis. Narrative:  EXAM: XR CHEST PORT       INDICATION: Acute hypoxic respiratory failure       COMPARISON: 7/24/2022       FINDINGS: A portable AP radiograph of the chest was obtained at 1147 hours. The   patient is on a cardiac monitor. The NG tube extends below the hemidiaphragm. There is linear subsegmental atelectasis at the left lung base, unchanged.    Linear subsegmental atelectasis at the right lung base appears to have   increased. The cardiac and mediastinal contours and pulmonary vascularity are   stable. The bones and soft tissues are grossly within normal limits. Results from Hospital Encounter encounter on 07/22/22    XR CHEST PORT    Narrative  PORTABLE CHEST RADIOGRAPH/S: 7/27/2022 2:41 AM    INDICATION: Atelectasis. COMPARISON: 7/26/2022, 7/24/2022; CT abdomen pelvis 7/20/2022. TECHNIQUE: Portable frontal upright radiograph/s of the chest.    FINDINGS:  The lungs are hypoinflated, and there is atelectasis in the right middle lobe,  lingula, and bases. The central airways are patent. No pneumothorax and no large  pleural effusion. Impression  Shallow volumes and bibasilar atelectasis. XR CHEST PORT    Narrative  EXAM:  XR CHEST PORT    INDICATION:   Atelectasis    COMPARISON: Chest radiograph 7/25/2022. FINDINGS: AP radiograph of the chest was obtained. Gastric tube coursing into the stomach out of field of view. Mild diffuse  bilateral interstitial opacities with Kerley B lines noted. Trace left pleural  effusion with left basilar atelectasis. More focal heterogeneous retrocardiac  left lower lobe opacity. No pneumothorax. Heart size is within normal limits. No  acute osseous abnormalities. Impression  1. Pulmonary interstitial edema. Trace left pleural effusion and left basilar  atelectasis. 2. More focal left lower lobe retrocardiac opacity may represent  aspiration/infection versus atelectasis. XR CHEST PORT    Narrative  EXAM: XR CHEST PORT    INDICATION: Acute hypoxic respiratory failure    COMPARISON: 7/24/2022    FINDINGS: A portable AP radiograph of the chest was obtained at 1147 hours. The  patient is on a cardiac monitor. The NG tube extends below the hemidiaphragm. There is linear subsegmental atelectasis at the left lung base, unchanged. Linear subsegmental atelectasis at the right lung base appears to have  increased.  The cardiac and mediastinal contours and pulmonary vascularity are  stable. The bones and soft tissues are grossly within normal limits. Impression  Increasing right upper lobe subsegmental atelectasis. Persistent left lower lobe subsegmental atelectasis. Results from [place] encounter on 07/20/22    CT ABD PELV WO CONT    Narrative  EXAM: CT of the abdomen and pelvis    INDICATION: Pain. COMPARISON: None. TECHNIQUE: Axial CT imaging of the abdomen and pelvis was performed without  intravenous contrast. Multiplanar reformats were generated. Dose reduction  techniques used: automated exposure control, adjustment of the mAs and/or kVp  according to patient size, and iterative reconstruction techniques. Digital  imaging and communications in Medicine (DICOM) format image data are available  to nonaffiliated external healthcare facilities or entities on a secure, media  free, reciprocally searchable basis with patient authorization for at least 12  months after this study. _______________    FINDINGS:    LOWER CHEST: Unremarkable. LIVER, BILIARY: Liver is normal. No biliary dilation. Gallstones are noted. There is gallbladder wall thickening/pericholecystic fluid. PANCREAS: Normal.    SPLEEN: Normal.    ADRENALS: Normal.    KIDNEYS: Normal.    LYMPH NODES: No enlarged lymph nodes. GASTROINTESTINAL TRACT: No bowel dilation or wall thickening. The appendix is  visualized and is within normal limits. PELVIC ORGANS: Unremarkable. VASCULATURE: Unremarkable. BONES: No acute or aggressive osseous abnormalities identified. OTHER: None.    _______________    Impression  Gallstones with gallbladder wall thickening/pericholecystic fluid. The  gallbladder disease. No other significant abnormality seen. Discussion history is suspicious for obstructive sleep apnea and he would benefit from outpatient testing once he is discharged.   At the current time he has significant atelectasis on the chest x-ray and I have encouraged him to do self incentive spirometry hourly. Bed rest pain medications and atelectasis could be the entire cause of his hypoxia. Will supplement as needed while he is hospitalized  Discussed with the patient and answered all questions  7/26 received fentanyl at 2200 hrs. 3 AM 7 AM and developed obstructed airway signs with severe oxygen desaturation. He states Toradol did not entirely relieve his pain. Hopefully he is far enough out from surgery that the pain will start decreasing on its own. Phosphorus is low will replete tube  7/27 no episodes last night did not receive any narcotics. He is awake alert now on room air. Potassium and phosphorus to be repleted. Diet is being advanced         Thank you for allowing us to participate in the care of this patient.   We will follow along with you     Sierra Long MD

## 2022-07-27 NOTE — PROGRESS NOTES
CM reviewed chart. Discharge disposition is pending PT/OT recommendations. CM will continue to follow.

## 2022-07-27 NOTE — PROGRESS NOTES
TRANSFER - IN REPORT:    Verbal report received from Ana Haney Rn(name) on [address]  being received from ICU(unit) for routine progression of care      Report consisted of patients Situation, Background, Assessment and   Recommendations(SBAR). Information from the following report(s) SBAR and Kardex was reviewed with the receiving nurse. Opportunity for questions and clarification was provided. Assessment completed upon patients arrival to unit and care assumed.

## 2022-07-27 NOTE — CONSULTS
Consult Date: 7/27/2022    Consults  Covid-19    Subjective   This is a 52year old male with Acute cholecystitis status post open cholecystectomy with drain placement. Patient developed acute hypoxic respiratory failure and yesterday tested positive for Covid-19. He has been afebrile with now normal WBC. CXR today showed shallow volumes and bibasilar atelectasis. Images reviewed by me. Currently on IV Zosyn for cholecystitis. ID has been consulted for Covid-19. He is currently on Nasal cannula. Pulmonary also following. Patient has just been transferred out of the ICU. He states that he has had Covid-19 multiple times but admittedly, he has never been tested. He refuses to get the Covid vaccine reportedly because of politics surrounding it. No known Covid exposure. He denies  any cough or SOB, though testing apparently prompted by respiratory difficulty which  has resolved and he is now on room air. History reviewed. No pertinent past medical history. No past surgical history on file. History reviewed. No pertinent family history.    Social History     Tobacco Use    Smoking status: Never    Smokeless tobacco: Never   Substance Use Topics    Alcohol use: Never       Current Facility-Administered Medications   Medication Dose Route Frequency Provider Last Rate Last Admin    potassium chloride SR (KLOR-CON 10) tablet 40 mEq  40 mEq Oral NOW David Mars MD        potassium, sodium phosphates (NEUTRA-PHOS) packet 2 Packet  2 Packet Oral Q4H David Mars MD        ketorolac (TORADOL) injection 15 mg  15 mg IntraVENous Q6H PRN Gearl MD Deann   15 mg at 07/26/22 2134    lactated Ringers infusion  100 mL/hr IntraVENous CONTINUOUS Jeremi Hayes  mL/hr at 07/27/22 0033 125 mL/hr at 07/27/22 0033    piperacillin-tazobactam (ZOSYN) 3.375 g in 0.9% sodium chloride (MBP/ADV) 100 mL MBP  3.375 g IntraVENous Q8H Ahsan Hayes MD 25 mL/hr at 07/27/22 0817 3.375 g at 07/27/22 0817    sodium chloride (NS) flush 5-40 mL  5-40 mL IntraVENous Q8H Matt Sutherland MD   10 mL at 07/27/22 3926    sodium chloride (NS) flush 5-40 mL  5-40 mL IntraVENous PRN Matt Sutherland MD        acetaminophen (TYLENOL) tablet 650 mg  650 mg Oral Q6H PRN Matt Sutherland MD   650 mg at 07/26/22 2134    Or    acetaminophen (TYLENOL) suppository 650 mg  650 mg Rectal Q6H PRN Matt Sutherland MD        polyethylene glycol (MIRALAX) packet 17 g  17 g Oral DAILY PRN Matt Sutherland MD   17 g at 07/23/22 1337    ondansetron (ZOFRAN ODT) tablet 4 mg  4 mg Oral Q8H PRN Matt Sutherland MD        Or    ondansetron [facility]) injection 4 mg  4 mg IntraVENous Q6H PRN Matt Sutherland MD   4 mg at 07/26/22 2134    *Please  patient's home medications from pharmacy at discharge*  1 Each Other Mikayla Golden MD            Review of Systems   Constitutional: Negative. Negative for chills and fever. HENT: Negative. Eyes: Negative. Respiratory:  Negative for cough and shortness of breath. Cardiovascular: Negative. Gastrointestinal: Negative. Endocrine: Negative. Genitourinary: Negative. Musculoskeletal: Negative. Skin: Negative. Objective     Vital signs for last 24 hours:  Visit Vitals  /86   Pulse 100   Temp 98.4 °F (36.9 °C)   Resp 20   Ht 5' 10.98\" (1.803 m)   Wt 229 lb (103.9 kg)   SpO2 94%   BMI 31.95 kg/m²       Intake/Output this shift:  Current Shift: No intake/output data recorded.   Last 3 Shifts: 07/25 1901 - 07/27 0700  In: 2393.3 [I.V.:2283.3]  Out: 3486 [Urine:1325; Drains:460]    Data Review:   Recent Results (from the past 24 hour(s))   COVID-19 RAPID TEST    Collection Time: 07/26/22  6:24 PM   Result Value Ref Range    COVID-19 rapid test DETECTED (A) Not Detected     RENAL FUNCTION PANEL    Collection Time: 07/27/22  4:30 AM   Result Value Ref Range    Sodium 140 136 - 145 mmol/L    Potassium 3.3 (L) 3.5 - 5.1 mmol/L    Chloride 104 97 - 108 mmol/L    CO2 28 21 - 32 mmol/L    Anion gap 8 5 - 15 mmol/L    Glucose 89 65 - 100 mg/dL    BUN 8 6 - 20 mg/dL    Creatinine 0.67 (L) 0.70 - 1.30 mg/dL    BUN/Creatinine ratio 12 12 - 20      GFR est AA >60 >60 ml/min/1.73m2    GFR est non-AA >60 >60 ml/min/1.73m2    Calcium 7.9 (L) 8.5 - 10.1 mg/dL    Phosphorus 2.5 (L) 2.6 - 4.7 mg/dL    Albumin 2.3 (L) 3.5 - 5.0 g/dL   CBC WITH AUTOMATED DIFF    Collection Time: 07/27/22  4:30 AM   Result Value Ref Range    WBC 7.5 4.1 - 11.1 K/uL    RBC 3.12 (L) 4.10 - 5.70 M/uL    HGB 9.7 (L) 12.1 - 17.0 g/dL    HCT 28.1 (L) 36.6 - 50.3 %    MCV 90.1 80.0 - 99.0 FL    MCH 31.1 26.0 - 34.0 PG    MCHC 34.5 30.0 - 36.5 g/dL    RDW 11.9 11.5 - 14.5 %    PLATELET 369 442 - 043 K/uL    MPV 9.0 8.9 - 12.9 FL    NRBC 0.0 0.0  WBC    ABSOLUTE NRBC 0.00 0.00 - 0.01 K/uL    NEUTROPHILS 75 32 - 75 %    LYMPHOCYTES 14 12 - 49 %    MONOCYTES 7 5 - 13 %    EOSINOPHILS 4 0 - 7 %    BASOPHILS 0 0 - 1 %    IMMATURE GRANULOCYTES 0 0 - 0.5 %    ABS. NEUTROPHILS 5.6 1.8 - 8.0 K/UL    ABS. LYMPHOCYTES 1.1 0.8 - 3.5 K/UL    ABS. MONOCYTES 0.5 0.0 - 1.0 K/UL    ABS. EOSINOPHILS 0.3 0.0 - 0.4 K/UL    ABS. BASOPHILS 0.0 0.0 - 0.1 K/UL    ABS. IMM. GRANS. 0.0 0.00 - 0.04 K/UL    DF AUTOMATED     MAGNESIUM    Collection Time: 07/27/22  4:30 AM   Result Value Ref Range    Magnesium 2.0 1.6 - 2.4 mg/dL     CXR (7/27)        Physical Exam  Vitals and nursing note reviewed. Constitutional:       Appearance: He is not ill-appearing. HENT:      Head: Normocephalic and atraumatic. Right Ear: External ear normal.      Left Ear: External ear normal.      Nose: Nose normal.      Mouth/Throat:      Pharynx: Oropharynx is clear. Cardiovascular:      Rate and Rhythm: Normal rate and regular rhythm. Heart sounds: No murmur heard. Pulmonary:      Effort: Pulmonary effort is normal.      Breath sounds: Normal breath sounds.    Abdominal:      General: Bowel sounds are normal. There is no distension. Palpations: Abdomen is soft. Tenderness: There is no abdominal tenderness. There is no guarding. Comments: Intact abdominal incision    ISRRAEL drainage catheter   Genitourinary:     Comments: No [place]  Musculoskeletal:      Cervical back: Neck supple. Right lower leg: No edema. Left lower leg: No edema. Skin:     Findings: No rash. Neurological:      General: No focal deficit present. Mental Status: He is alert and oriented to person, place, and time. Psychiatric:         Mood and Affect: Mood normal.         Behavior: Behavior normal.         Thought Content: Thought content normal.         Judgment: Judgment normal.       ASSESSMENT/PLAN    Covid-19 infection with no significant respiratory component  Transient hypoxic respiratory failure, resolved  Acute cholecystitis, status post open cholecystectomy with ISRRAEL drainage, on IV Zosyn    Comment:  Patient had a bonafide incident of respiratory failure for which it was perhaps reasonable to test for Covid-19, however, his CXR is unremarkable and he is on room air. CT Chest would be more sensitive for pneumonitis, however, I do not think it would change oure management. Paxolovid would be useful but unavailable. No indication for Remdesivir, Dexamethasone or Baricitinib at this time  In am, check CRP, procal, LDH and ferritin  Conferred with Pulmonary consultant  4. Continue IV Zosyn per Attending    Bonifacio Fields.  Carol Manning MD

## 2022-07-28 VITALS
HEART RATE: 96 BPM | SYSTOLIC BLOOD PRESSURE: 150 MMHG | WEIGHT: 229 LBS | RESPIRATION RATE: 18 BRPM | TEMPERATURE: 98.3 F | DIASTOLIC BLOOD PRESSURE: 91 MMHG | OXYGEN SATURATION: 95 % | BODY MASS INDEX: 32.06 KG/M2 | HEIGHT: 71 IN

## 2022-07-28 PROBLEM — K80.41: Status: ACTIVE | Noted: 2022-07-28

## 2022-07-28 LAB
ALBUMIN SERPL-MCNC: 2.8 G/DL (ref 3.5–5)
ANION GAP SERPL CALC-SCNC: 4 MMOL/L (ref 5–15)
BUN SERPL-MCNC: 4 MG/DL (ref 6–20)
BUN/CREAT SERPL: 4 (ref 12–20)
CA-I BLD-MCNC: 8.7 MG/DL (ref 8.5–10.1)
CHLORIDE SERPL-SCNC: 108 MMOL/L (ref 97–108)
CO2 SERPL-SCNC: 28 MMOL/L (ref 21–32)
CREAT SERPL-MCNC: 0.92 MG/DL (ref 0.7–1.3)
CRP SERPL-MCNC: 12.5 MG/DL (ref 0–0.6)
GLUCOSE SERPL-MCNC: 93 MG/DL (ref 65–100)
LDH SERPL L TO P-CCNC: 209 U/L (ref 85–241)
PHOSPHATE SERPL-MCNC: 3.9 MG/DL (ref 2.6–4.7)
POTASSIUM SERPL-SCNC: 3.3 MMOL/L (ref 3.5–5.1)
PROCALCITONIN SERPL-MCNC: 0.16 NG/ML
SODIUM SERPL-SCNC: 140 MMOL/L (ref 136–145)

## 2022-07-28 PROCEDURE — 80069 RENAL FUNCTION PANEL: CPT

## 2022-07-28 PROCEDURE — 99232 SBSQ HOSP IP/OBS MODERATE 35: CPT | Performed by: INTERNAL MEDICINE

## 2022-07-28 PROCEDURE — 97116 GAIT TRAINING THERAPY: CPT

## 2022-07-28 PROCEDURE — 74011000258 HC RX REV CODE- 258: Performed by: SURGERY

## 2022-07-28 PROCEDURE — 86140 C-REACTIVE PROTEIN: CPT

## 2022-07-28 PROCEDURE — 82728 ASSAY OF FERRITIN: CPT

## 2022-07-28 PROCEDURE — 74011250636 HC RX REV CODE- 250/636: Performed by: SURGERY

## 2022-07-28 PROCEDURE — 74011250637 HC RX REV CODE- 250/637: Performed by: SURGERY

## 2022-07-28 PROCEDURE — 84145 PROCALCITONIN (PCT): CPT

## 2022-07-28 PROCEDURE — G0378 HOSPITAL OBSERVATION PER HR: HCPCS

## 2022-07-28 PROCEDURE — 74011000250 HC RX REV CODE- 250: Performed by: SURGERY

## 2022-07-28 PROCEDURE — 36415 COLL VENOUS BLD VENIPUNCTURE: CPT

## 2022-07-28 PROCEDURE — 83615 LACTATE (LD) (LDH) ENZYME: CPT

## 2022-07-28 RX ORDER — POTASSIUM CHLORIDE 750 MG/1
10 TABLET, FILM COATED, EXTENDED RELEASE ORAL
Status: COMPLETED | OUTPATIENT
Start: 2022-07-28 | End: 2022-07-28

## 2022-07-28 RX ADMIN — PIPERACILLIN AND TAZOBACTAM 3.38 G: 3; .375 INJECTION, POWDER, FOR SOLUTION INTRAVENOUS at 08:36

## 2022-07-28 RX ADMIN — POTASSIUM CHLORIDE 10 MEQ: 750 TABLET, FILM COATED, EXTENDED RELEASE ORAL at 16:46

## 2022-07-28 RX ADMIN — SODIUM CHLORIDE, POTASSIUM CHLORIDE, SODIUM LACTATE AND CALCIUM CHLORIDE 100 ML/HR: 600; 310; 30; 20 INJECTION, SOLUTION INTRAVENOUS at 00:03

## 2022-07-28 RX ADMIN — SODIUM CHLORIDE, PRESERVATIVE FREE 10 ML: 5 INJECTION INTRAVENOUS at 05:55

## 2022-07-28 RX ADMIN — SODIUM CHLORIDE, PRESERVATIVE FREE 10 ML: 5 INJECTION INTRAVENOUS at 13:06

## 2022-07-28 RX ADMIN — PIPERACILLIN AND TAZOBACTAM 3.38 G: 3; .375 INJECTION, POWDER, FOR SOLUTION INTRAVENOUS at 00:03

## 2022-07-28 NOTE — DISCHARGE SUMMARY
[facility] SURGERY DISCHARGE SUMMARY      I have been requested by Dr. Arnol Casey to manage this patient since he is leaving out of town. Chief Complaint: Abdominal pain   x  3 days    History of Present Illness:    Mr. Yoon Christopher is a 52y.o. year old * male who presented initially for right upper abdominal pain. Patient states his pain started suddenly 2 days ago but admits to a poor diet and drinking large amounts of caffeine earlier in the morning. He has had similar pain for months in his right upper back but did not seek medical attention for it. Patient was initially evaluated at [facility] ED for his symptoms and reports CT at that time demonstrated gallstones; however he felt his pain had improved so he was discharged home. Once home patient's pain returned so he presented again to [facility] ED and then was transferred to [facility]. CT abdomen/pelvis was consistent with acute cholecystitis with gallstones and gallbladder wall thickening/pericholecystic fluid. Patient was subsequently started on IVF and Zosyn. He states his pain is currently improved after pain medications. Denies nausea or vomiting. Felt feverish initially when symptoms began but did not take his temperature and has not been febrile since his admission. Past Medical History: History reviewed. No pertinent past medical history. Past Surgical History: No past surgical history on file. Allergy:  Allergies   Allergen Reactions    Opioids - Morphine Analogues Other (comments)     Patient becomes apneic with high dose narcotics (hydromorphone 2mg, fentanyl 75 mcg)        Social History:  reports that he has never smoked. He has never used smokeless tobacco. He reports that he does not drink alcohol and does not use drugs. Family History:History reviewed. No pertinent family history.      Current Medications:  Current Facility-Administered Medications:     potassium chloride SR (KLOR-CON 10) tablet 10 mEq, 10 mEq, Oral, NOW, Aleksey Hayes MD    ketorolac (TORADOL) injection 15 mg, 15 mg, IntraVENous, Q6H PRN, Ahsan Hayes MD, 15 mg at 07/26/22 2134    lactated Ringers infusion, 100 mL/hr, IntraVENous, CONTINUOUS, Ahsan Hayes MD, Last Rate: 100 mL/hr at 07/28/22 0003, 100 mL/hr at 07/28/22 0003    piperacillin-tazobactam (ZOSYN) 3.375 g in 0.9% sodium chloride (MBP/ADV) 100 mL MBP, 3.375 g, IntraVENous, Q8H, Ahsan Hayes MD, Last Rate: 25 mL/hr at 07/28/22 0836, 3.375 g at 07/28/22 0836    sodium chloride (NS) flush 5-40 mL, 5-40 mL, IntraVENous, Q8H, Clark Sutherland MD, 10 mL at 07/28/22 1306    sodium chloride (NS) flush 5-40 mL, 5-40 mL, IntraVENous, PRN, Clark Sutherland MD    acetaminophen (TYLENOL) tablet 650 mg, 650 mg, Oral, Q6H PRN, 650 mg at 07/27/22 1110 **OR** acetaminophen (TYLENOL) suppository 650 mg, 650 mg, Rectal, Q6H PRN, Clark Sutherland MD    polyethylene glycol (MIRALAX) packet 17 g, 17 g, Oral, DAILY PRN, Clark Sutherland MD, 17 g at 07/23/22 1337    ondansetron (ZOFRAN ODT) tablet 4 mg, 4 mg, Oral, Q8H PRN **OR** ondansetron (ZOFRAN) injection 4 mg, 4 mg, IntraVENous, Q6H PRN, Clark Sutherland MD, 4 mg at 07/26/22 2134    *Please  patient's home medications from pharmacy at discharge*, 1 Each, Other, PRIOR TO DISCHARGE, Clark Sutherland MD     Immunization History: There is no immunization history on file for this patient. Complete    Review of Systems:     Constitutional:  no fever,  no chills,  no sweats, No weakness, No fatigue, No decreased activity. Eye: No recent visual problem, No icterus, No discharge, No double vision. Ear/Nose/Mouth/Throat: No decreased hearing, No ear pain, No nasal congestion, No sore throat. Respiratory: No shortness of breath, No cough, No sputum production, No hemoptysis, No wheezing, No cyanosis.   Cardiovascular: No chest pain, No palpitations, No bradycardia, No tachycardia, No peripheral edema, No syncope. Gastrointestinal: No nausea,  No vomiting, No diarrhea, No constipation, No heartburn,  Abdominal pain. Genitourinary: No dysuria, No hematuria, No change in urine stream, No urethral discharge, No lesions. Hematology/Lymphatics: No bruising tendency, No bleeding tendency, No petechiae, No swollen lymph glands. Endocrine: No excessive thirst, No polyuria, No cold intolerance, No heat intolerance, No excessive hunger. Immunologic: Not immunocompromised, No recurrent fevers, No recurrent infections. Musculoskeletal: No back pain, No neck pain, No joint pain, No muscle pain, No claudication, No decreased range of motion, No trauma. Integumentary: No rash, No pruritus, No abrasions. Neurologic: Alert and oriented X4, No abnormal balance, No headache, No confusion, No numbness, No tingling. Psychiatric: No anxiety, No depression, No mookie. Physical Exam:     Vitals & Measurements:     Wt Readings from Last 3 Encounters:   07/22/22 103.9 kg (229 lb)   07/21/22 103.9 kg (229 lb)   07/20/22 103.9 kg (229 lb)     Temp Readings from Last 3 Encounters:   07/28/22 97.7 °F (36.5 °C)   07/21/22 98.5 °F (36.9 °C)   07/20/22 97.6 °F (36.4 °C)     BP Readings from Last 3 Encounters:   07/28/22 (!) 146/95   07/22/22 (!) 163/93   07/21/22 (!) 152/100     Pulse Readings from Last 3 Encounters:   07/28/22 99   07/22/22 78   07/21/22 84      Ht Readings from Last 3 Encounters:   07/25/22 5' 10.98\" (1.803 m)   07/21/22 5' 11\" (1.803 m)   07/20/22 5' 11\" (1.803 m)          General: well appearing, no acute distress  Head: Normal  Face: Nornal  HEENT: atraumatic, PERRLA, moist mucosa, normal pharynx, normal tonsils and adenoids, normal tongue, no fluid in sinuses  Neck: Trachea midline, no carotid bruit, no masses  Chest: Normal.  Respiratory: Normal chest wall expansion, CTA B, no r/r/w, no rubs  Cardiovascular: RRR, no m/r/g, Normal S1 and S2  Abdomen: Soft, non tender, non-distended, normal bowel sounds in all quadrants, no hepatosplenomegaly, no tympany. Incision scar: none  Genitourinary: No inguinal hernia, normal external gentalia, Testis & scrotum normal, no renal angle tenderness  Rectal: deferred  Musculoskeletal: normal ROM in upper and lower extremities, No joint swelling. Integumentary: Warm, dry, and pink, with no rash, purpura, or petechia  Heme/Lymph: No lymphadenopathy, no bruises  Neurological:Cranial Nerves II-XII grossly intact, no gross motor or sensory deficit  Psychiatric: Cooperative with normal mood, affect, and cognition      Laboratory Values:   Recent Results (from the past 24 hour(s))   RENAL FUNCTION PANEL    Collection Time: 07/28/22  7:37 AM   Result Value Ref Range    Sodium 140 136 - 145 mmol/L    Potassium 3.3 (L) 3.5 - 5.1 mmol/L    Chloride 108 97 - 108 mmol/L    CO2 28 21 - 32 mmol/L    Anion gap 4 (L) 5 - 15 mmol/L    Glucose 93 65 - 100 mg/dL    BUN 4 (L) 6 - 20 mg/dL    Creatinine 0.92 0.70 - 1.30 mg/dL    BUN/Creatinine ratio 4 (L) 12 - 20      GFR est AA >60 >60 ml/min/1.73m2    GFR est non-AA >60 >60 ml/min/1.73m2    Calcium 8.7 8.5 - 10.1 mg/dL    Phosphorus 3.9 2.6 - 4.7 mg/dL    Albumin 2.8 (L) 3.5 - 5.0 g/dL   C REACTIVE PROTEIN, QT    Collection Time: 07/28/22  7:37 AM   Result Value Ref Range    C-Reactive protein 12.50 (H) 0.00 - 0.60 mg/dL   PROCALCITONIN    Collection Time: 07/28/22  7:37 AM   Result Value Ref Range    Procalcitonin 0.16 (H) 0 ng/mL   LD    Collection Time: 07/28/22  7:37 AM   Result Value Ref Range     85 - 241 U/L         XR CHEST PORT   Final Result   Shallow volumes and bibasilar atelectasis. XR CHEST PORT   Final Result   1. Pulmonary interstitial edema. Trace left pleural effusion and left basilar   atelectasis. 2. More focal left lower lobe retrocardiac opacity may represent   aspiration/infection versus atelectasis. XR CHEST PORT   Final Result      Increasing right upper lobe subsegmental atelectasis.     Persistent left lower lobe subsegmental atelectasis. XR CHEST PORT   Final Result   Shallow volumes and bibasilar atelectasis. XR FLUOROSCOPY UNDER 60 MINUTES   Final Result          Assessment:  Problem List Items Addressed This Visit          Digestive    Cholecystitis - Primary        Plan:    Admission  Diet: NPO  IV fluids  SCD  IS  Pain medications  Antibiotics: Zosyn  Nausea medication  Labs in am  OR in the AM  Procedure/Surgery: Lap cholecystectomy, possible open   12. Risk, benefits and complications including bleeding, infection, dvt, pe, mi, stroke, sepsis, injury to bowel, bile duct, bile leak,  dehiscence, open, discussed, questions answered, patient & family clearly understands and agrees with plan. All their questions were answered to their satisfaction. RN was present during entire conversation. Hospital Course:  Underwent Laparoscopy converted open cholecystectomy with drain placement. Post operatively he was having respiratory issues especially with narcotic pain medications. He was briefly transferred to ICU and Pulmonary consulted. He did well overall. COVID 19 came back positive. ID consult obtained and he did not recommend any other medications or steroids since he was doing very well on room air. He tolerated diet. D/C home today. Thank you for the consultation & allowing me to participate in the care of this patient.

## 2022-07-28 NOTE — PROGRESS NOTES
S/P laparoscopic converted to open cholecystectomy, POD#4    Subjective:  Mr. Sera Evans is a 52y.o. year old * male admitted for acute cholecystitis s/p Open cholecystectomy 07/25/22. Patient reports feeling well today. Denies any abdominal pain. No fever, nausea or vomiting. He was able to complete PT without any difficulty. Passing flatus and had a BM this morning. No fevers, cough, or SOB. Patient + for COVID-19. WBC within normal limits. Review of Systems:   Constitutional:  no fever, no chills,  no sweats, No weakness, No fatigue, No decreased activity. Respiratory: No shortness of breath, No cough, No sputum production, No hemoptysis, No wheezing, No cyanosis. Cardiovascular: No chest pain, No palpitations, No bradycardia, No tachycardia, No peripheral edema, No syncope. Gastrointestinal: No nausea, No vomiting, No diarrhea, No constipation, No heartburn, No abdominal pain. Genitourinary: No dysuria, No hematuria, No change in urine stream, No urethral discharge, No lesions. Hematology/Lymphatics: No bruising tendency, No bleeding tendency, No petechiae, No swollen lymph glands. Endocrine: No excessive thirst, No polyuria, No cold intolerance, No heat intolerance, No excessive hunger. Musculoskeletal: No back pain, No neck pain, No joint pain, No muscle pain, No claudication, No decreased range of motion, No trauma. Integumentary: No rash, No pruritus, No abrasions. Neurologic: Alert and oriented X4, No abnormal balance, No headache, No confusion, No numbness, No tingling. Psychiatric: No anxiety, No depression, No mookie. Physical Exam:     Vitals & Measurements:     Wt Readings from Last 3 Encounters:   07/22/22 103.9 kg (229 lb)   07/21/22 103.9 kg (229 lb)   07/20/22 103.9 kg (229 lb)     Temp Readings from Last 3 Encounters:   07/28/22 97.7 °F (36.5 °C)   07/21/22 98.5 °F (36.9 °C)   07/20/22 97.6 °F (36.4 °C)     BP Readings from Last 3 Encounters:   07/28/22 (!) 146/95   07/22/22 (!) 163/93   07/21/22 (!) 152/100     Pulse Readings from Last 3 Encounters:   07/28/22 99   07/22/22 78   07/21/22 84      Ht Readings from Last 3 Encounters:   07/25/22 5' 10.98\" (1.803 m)   07/21/22 5' 11\" (1.803 m)   07/20/22 5' 11\" (1.803 m)      Date 07/27/22 0700 - 07/28/22 0659 07/28/22 0700 - 07/29/22 0659   Shift 0713-6726 1319-1678 24 Hour Total 6565-9056 2177-4368 24 Hour Total   INTAKE   Shift Total(mL/kg)         OUTPUT   Urine(mL/kg/hr) 500(0.4)  500(0.2)        Urine Voided 500  500      Drains 20 25 45        Output (ml) (Angel-De Los Santos Drain 07/24/22 Anterior;Mid Abdomen) 20 25 45      Shift Total(mL/kg) 520(5) 25(0.2) 545(5.2)      NET -520 -25 -545      Weight (kg) 103.9 103.9 103.9 103.9 103.9 103.9       General: well appearing, no acute distress  Head: Normal  Face: Nornal  HEENT: atraumatic, PERRLA, moist mucosa, normal pharynx, normal tonsils and adenoids, normal tongue, no fluid in sinuses. Neck: Trachea midline, no carotid bruit, no masses  Chest: Normal.  Respiratory: normal chest wall expansion, CTA B, no r/r/w, no rubs  Cardiovascular: Tachycardic to 103 at bedside, regular rhythm, no m/r/g, Normal S1 and S2  Abdomen: Soft, non tender, non-distended, normal bowel sounds in all quadrants, no hepatosplenomegaly, no tympany. Incision scar: dressings intact over incision sites. Appropriate tenderness near incision sites. +ISRRAEL drain with serous output, about 25cc. Genitourinary: No inguinal hernia, normal external gentalia, Testis & scrotum normal, no renal angle tenderness. Rectal: deferred  Musculoskeletal: Normal ROM in upper and lower extremities, No joint swelling. Integumentary: Warm, dry, and pink, with no rash, purpura, or petechia  Heme/Lymph: No lymphadenopathy, no bruises  Neurological: Cranial Nerves II-XII grossly intact, No gross sensory or motor deficit.   Psychiatric: Cooperative with normal mood, affect, and cognition    Laboratory Values:   Recent Results (from the past 24 hour(s))   RENAL FUNCTION PANEL    Collection Time: 07/28/22  7:37 AM   Result Value Ref Range    Sodium 140 136 - 145 mmol/L    Potassium 3.3 (L) 3.5 - 5.1 mmol/L    Chloride 108 97 - 108 mmol/L    CO2 28 21 - 32 mmol/L    Anion gap 4 (L) 5 - 15 mmol/L    Glucose 93 65 - 100 mg/dL    BUN 4 (L) 6 - 20 mg/dL    Creatinine 0.92 0.70 - 1.30 mg/dL    BUN/Creatinine ratio 4 (L) 12 - 20      GFR est AA >60 >60 ml/min/1.73m2    GFR est non-AA >60 >60 ml/min/1.73m2    Calcium 8.7 8.5 - 10.1 mg/dL    Phosphorus 3.9 2.6 - 4.7 mg/dL    Albumin 2.8 (L) 3.5 - 5.0 g/dL   C REACTIVE PROTEIN, QT    Collection Time: 07/28/22  7:37 AM   Result Value Ref Range    C-Reactive protein 12.50 (H) 0.00 - 0.60 mg/dL   PROCALCITONIN    Collection Time: 07/28/22  7:37 AM   Result Value Ref Range    Procalcitonin 0.16 (H) 0 ng/mL   LD    Collection Time: 07/28/22  7:37 AM   Result Value Ref Range     85 - 241 U/L           XR CHEST PORT   Final Result   Shallow volumes and bibasilar atelectasis. XR CHEST PORT   Final Result   1. Pulmonary interstitial edema. Trace left pleural effusion and left basilar   atelectasis. 2. More focal left lower lobe retrocardiac opacity may represent   aspiration/infection versus atelectasis. XR CHEST PORT   Final Result      Increasing right upper lobe subsegmental atelectasis. Persistent left lower lobe subsegmental atelectasis. XR CHEST PORT   Final Result   Shallow volumes and bibasilar atelectasis. XR FLUOROSCOPY UNDER 60 MINUTES   Final Result            Assessment:  Problem List Items Addressed This Visit          Digestive    Cholecystitis - Primary      S/P laparoscopic converted to open cholecystectomy 07/24/22, POD #4    COVID-19 infection    Plan:    Admission: transferred to 5W  Diet: soft diet  IV fluids  SCD  IS  Pain medications: Toradol. Antibiotics: Zosyn  Nausea medication  Labs   PT   Supplemental O2 discontinued.  Patient well appearing on RA. Appreciate ID consult given + COVID-19. Will continue to monitor patient's post-op status  D/c today with ISRRAEL drain if OK with ID. Thank you for allowing me to participate in the care of this patient.

## 2022-07-28 NOTE — PROGRESS NOTES
No appointment was made with Dr. Eboni Hi because I called the office and went to voicemail. So, I left message for them to call me and the patient to make appointment.

## 2022-07-28 NOTE — PROGRESS NOTES
CM reviewed chart, noted discharge order. Patient will be discharging home self care with his wife. Patient is clear to discharge home self care by CM. Nurse is aware. Discharge plan of care/case management plan validated with provider discharge order.

## 2022-07-28 NOTE — PROGRESS NOTES
Went over patients discharge papers. Provided opportunities for questions which were answered. Friend to transfer pt home. Discharged in stable condition.

## 2022-07-28 NOTE — PROGRESS NOTES
Progress Note    Patient: Marco A Buckner MRN: [id number]  SSN: [id number]    YOB: 1975  Age: 52 y.o. Sex: male      Admit Date: 7/22/2022    LOS: 1 day     Subjective:   Patient followed for Covid-19 infection without significant respiratory issues. He remains on room air with no cough or SOB. He is afebrile       Objective:     Vitals:    07/28/22 0800 07/28/22 0815 07/28/22 1200 07/28/22 1600   BP:  (!) 146/95     Pulse: 99 99 99 99   Resp:  20     Temp:  97.7 °F (36.5 °C)     SpO2:  95%     Weight:       Height:            Intake and Output:  Current Shift: 07/28 0701 - 07/28 1900  In: -   Out: 440 [Urine:400; Drains:40]  Last three shifts: 07/26 1901 - 07/28 0700  In: -   Out: 1020 [Urine:825; Drains:195]    Physical Exam:   Vitals and nursing note reviewed. Constitutional:       Appearance: He is not ill-appearing. HENT:     Head: Normocephalic and atraumatic. Right Ear: External ear normal.     Left Ear: External ear normal.     Nose: Nose normal.     Mouth/Throat:     Pharynx: Oropharynx is clear. Cardiovascular:     Rate and Rhythm: Normal rate and regular rhythm. Heart sounds: No murmur heard. Pulmonary:     Effort: Pulmonary effort is normal.     Breath sounds: Normal breath sounds. Abdominal:     General: Bowel sounds are normal. There is no distension. Palpations: Abdomen is soft. Tenderness: There is no abdominal tenderness. There is no guarding. Comments: Intact abdominal incision     ISRRAEL drainage catheter   Genitourinary:     Comments: No [place]  Musculoskeletal:     Cervical back: Neck supple. Right lower leg: No edema. Left lower leg: No edema. Skin:     Findings: No rash. Neurological:     General: No focal deficit present. Mental Status: He is alert and oriented to person, place, and time. Psychiatric:         Mood and Affect: Mood normal.         Behavior: Behavior normal.         Thought Content:  Thought content normal. Judgment: Judgment normal.       Lab/Data Review:         Procal 0.16  CRP 12.50       Assessment:     Active Problems:    Cholecystitis (7/22/2022)      Acute cholecystitis (7/22/2022)      Bile duct stone with cholecystitis and obstruction (7/28/2022)    Covid-19 infection with no significant respiratory component  Transient hypoxic respiratory failure, resolved  Acute cholecystitis, status post open cholecystectomy with ISRRAEL drainage, on IV Zosyn     Comment:  Still no respiratory complications from [id number]. LDH is normal.  Elevated procal and CRP likely related to cholecystitis.     Plan:     No indication for Remdesivir, Dexamethasone or Baricitinib at this time  Continue IV Zosyn per Attending       Signed By: Zacarias Griffin MD     July 28, 2022

## 2022-07-28 NOTE — PROGRESS NOTES
Problem: Pain  Goal: *Control of Pain  Outcome: Progressing Towards Goal     Problem: Falls - Risk of  Goal: *Absence of Falls  Description: Document Jay Fall Risk and appropriate interventions in the flowsheet.   Outcome: Progressing Towards Goal  Note: Fall Risk Interventions:  Mobility Interventions: Patient to call before getting OOB         Medication Interventions: Bed/chair exit alarm                   Problem: Airway Clearance - Ineffective  Goal: Achieve or maintain patent airway  Outcome: Progressing Towards Goal

## 2022-07-28 NOTE — PROGRESS NOTES
PHYSICAL THERAPY TREATMENT  Patient: Reema Bradley (24 y.o. male)  Date: 7/28/2022  Diagnosis: Cholecystitis [K81.9]  Acute cholecystitis [K81.0]  Bile duct stone with cholecystitis and obstruction [K80.41] <principal problem not specified>  Procedure(s) (LRB):  CHOLECYSTECTOMY LAPAROSCOPIC CONVERTED TO OPEN CHOLECYSTECTOMY (N/A) 4 Days Post-Op  Precautions:    Chart, physical therapy assessment, plan of care and goals were reviewed. ASSESSMENT  Patient continues with skilled PT services and is progressing towards goals. Patient supine in bed upon approach and agreed to therapy session Patient had NG tube removed since last visit. Patient then was independent for bed mobility, supine<>sit and scooting EOB at well as STS<>. Patient reported that he has been up and walking without assistance and going to the bathroom on his own. Patient then stood and ambulated at supervision to bathroom where patient was able to use toilet and perform pia care without assistance. Patient then ambulated inside room for 100 feet at supervision without any AD. Patient then performed 1x10 squats, 15 foot tandem walk without any LOB, and 30 second single leg stance without any LOB or unsteadiness. Patient then returned to supine in bed and left supine with call bell within reach and all needs meet. Other factors to consider for discharge: PLOF, assistance at home. PLAN :  Patient continues to benefit from skilled intervention to address the above impairments. Continue treatment per established plan of care. to address goals. Recommendation for discharge: (in order for the patient to meet his/her long term goals)  Home with Family Care    This discharge recommendation:  Has been made in collaboration with the attending provider and/or case management    IF patient discharges home will need the following DME: none       SUBJECTIVE:   Patient stated im good now I just need to catch up on my sleep.     OBJECTIVE DATA SUMMARY:   Critical Behavior:  Neurologic State: Alert  Orientation Level: Oriented X4  Cognition: Follows commands     Functional Mobility Training:  Bed Mobility:  Rolling: Independent  Supine to Sit: Independent  Sit to Supine: Independent  Scooting: Independent  Transfers:  Sit to Stand: Independent  Stand to Sit: Independent  Bed to Chair: Independent  Balance:  Sitting: Intact; Without support  Standing: Intact; Without support  Ambulation/Gait Training:  Distance (ft): 100 Feet (ft)  Assistive Device: Other (comment) (none)  Ambulation - Level of Assistance: Independent  Therapeutic Exercises:   Tandem walk ,   1x10 squats  1x30 second single leg stance  Pain Ratin/10    Activity Tolerance:   Good  Please refer to the flowsheet for vital signs taken during this treatment. After treatment patient left in no apparent distress:   Supine in bed and Call bell within reach    COMMUNICATION/COLLABORATION:   The patients plan of care was discussed with: Registered nurse. Problem: Mobility Impaired (Adult and Pediatric)  Goal: *Acute Goals and Plan of Care (Insert Text)  Description: Pt stated goal: to go home  Pt will be I with LE HEP in 7 days. Pt will perform transfers with mod I in 7 days. Pt will amb 100-200 feet with LRAD safely with mod I in 7 days. Pt will ascend/descend 4 steps with B handrails and CGA in 7 days to safely enter home.        Outcome: Progressing Towards Goal       Augustin Acuna PTA   Time Calculation: 26 mins

## 2022-07-28 NOTE — PROGRESS NOTES
Consult  Pulmonary, Critical Care    Name: Elías Merchant MRN: [id number]   : 1975 Hospital: [facility]   Date: 2022  Admission date: 2022 Hospital Day: 7       Subjective/Interval History:   Notified of consult late this morning not sure when it was ordered. Apparently he had oxygen desaturation during the night while sleeping and is currently on a 40% Ventimask. He denies any past medical history does state that he is known to be a severe snorer and in fact will wake himself up with snorting sort of snores. He has vivid dreams on a nightly basis. 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 He has no signs of any pulmonary involvement his admission CT of the abdomen included the lower chest and was clear. Currently on room air and being considered for discharge    Patient Active Problem List   Diagnosis Code    Cholecystitis K81.9    Acute cholecystitis K81.0    Bile duct stone with cholecystitis and obstruction K80.41       IMPRESSION:   Acute cholecystitis  Status post open cholecystectomy  Acute hypoxic respiratory failure now on room air  Hypokalemia  Hypophosphatemia repleted  Possible obstructive sleep apnea worsened by narcotics  Bibasilar atelectasis resolved  COVID-19 antigen testing positive appears asymptomatic  Body mass index is 31.95 kg/m².         RECOMMENDATIONS/PLAN:   Would be worthwhile to undergo outpatient sleep study once he is discharged  No evidence of pulmonary involvement with COVID-19 he appears to be in asymptomatic infection             Subjective/Initial History:   I have reviewed the flowsheet and previous days notes. I was asked by Lilian Litten, MD to see Alysa Pickens a 52 y.o.  male  in consultation for a chief complaint of acute hypoxic respiratory failure          Patient PCP: None  PMH:  has no past medical history on file. PSH:   has no past surgical history on file. FHX: family history is not on file. SHX:  reports that he has never smoked. He has never used smokeless tobacco. He reports that he does not drink alcohol and does not use drugs. Systemic review:  General weight stable at home no chronic fever chills or sweats  Eyes no double vision or momentary blindness  Endocrinologic no polyuria polydipsia  Musculoskeletal no swollen tender joint  ENT no sinus congestion or drainage  Neurologic no seizures or syncope  Gastrointestinal acute onset abdominal pain at the time of admission with acute cholecystitis status post open cholecystectomy  Due to urinary no pain discomfort or bleeding on urination  Cardiovascular no history heart disease chest pain diaphoresis ankle edema or nocturia  Respiratory no chronic cough sputum production fever chills or sweats. He was a smoker stopped over 20 years ago no history of emphysema asthma. He does have severe snoring has been noted to have breath-holding spells at night with snorting type of respirations. Has vivid dreams almost every night.   Wakes up refreshed does not fall asleep during the daytime    Allergies   Allergen Reactions    Opioids - Morphine Analogues Other (comments)     Patient becomes apneic with high dose narcotics (hydromorphone 2mg, fentanyl 75 mcg)       MEDS:   Current Facility-Administered Medications   Medication    potassium chloride SR (KLOR-CON 10) tablet 10 mEq    ketorolac (TORADOL) injection 15 mg    lactated Ringers infusion    piperacillin-tazobactam (ZOSYN) 3.375 g in 0.9% sodium chloride (MBP/ADV) 100 mL MBP    sodium chloride (NS) flush 5-40 mL    sodium chloride (NS) flush 5-40 mL    acetaminophen (TYLENOL) tablet 650 mg    Or    acetaminophen (TYLENOL) suppository 650 mg    polyethylene glycol (MIRALAX) packet 17 g    ondansetron (ZOFRAN ODT) tablet 4 mg    Or    ondansetron (ZOFRAN) injection 4 mg    *Please  patient's home medications from pharmacy at discharge*        Current Facility-Administered Medications:     potassium chloride SR (KLOR-CON 10) tablet 10 mEq, 10 mEq, Oral, NOW, Ahsan Hayes MD    ketorolac (TORADOL) injection 15 mg, 15 mg, IntraVENous, Q6H PRN, Ahsan Hayes MD, 15 mg at 07/26/22 2134    lactated Ringers infusion, 100 mL/hr, IntraVENous, CONTINUOUS, Ahsan Hayes MD, Last Rate: 100 mL/hr at 07/28/22 0003, 100 mL/hr at 07/28/22 0003    piperacillin-tazobactam (ZOSYN) 3.375 g in 0.9% sodium chloride (MBP/ADV) 100 mL MBP, 3.375 g, IntraVENous, Q8H, Ahsan Hayes MD, Last Rate: 25 mL/hr at 07/28/22 0836, 3.375 g at 07/28/22 0836    sodium chloride (NS) flush 5-40 mL, 5-40 mL, IntraVENous, Q8H, Fernanda Sutherland MD, 10 mL at 07/28/22 1306    sodium chloride (NS) flush 5-40 mL, 5-40 mL, IntraVENous, PRN, Fernanda Sutherland MD    acetaminophen (TYLENOL) tablet 650 mg, 650 mg, Oral, Q6H PRN, 650 mg at 07/27/22 1110 **OR** acetaminophen (TYLENOL) suppository 650 mg, 650 mg, Rectal, Q6H PRN, Fernanda Sutherland MD    polyethylene glycol (MIRALAX) packet 17 g, 17 g, Oral, DAILY PRN, Fernanda Sutherland MD, 17 g at 07/23/22 1337    ondansetron (ZOFRAN ODT) tablet 4 mg, 4 mg, Oral, Q8H PRN **OR** ondansetron (ZOFRAN) injection 4 mg, 4 mg, IntraVENous, Q6H PRN, Fernanda Sutherland MD, 4 mg at 07/26/22 2134    *Please  patient's home medications from pharmacy at discharge*, 1 Each, Other, PRIOR TO DISCHARGE, Fernanda Sutherland MD      Objective:     Vital Signs: Telemetry:    normal sinus rhythm Intake/Output:   Visit Vitals  BP (!) 146/95 (BP 1 Location: Right upper arm, BP Patient Position: At rest;Sitting)   Pulse 99   Temp 97.7 °F (36.5 °C)   Resp 20   Ht 5' 10.98\" (1.803 m)   Wt 103.9 kg (229 lb)   SpO2 95%   BMI 31.95 kg/m²       Temp (24hrs), Av.8 °F (36.6 °C), Min:97.7 °F (36.5 °C), Max:97.8 °F (36.6 °C)        O2 Device: None (Room air) O2 Flow Rate (L/min): 2 l/min         Body mass index is 31.95 kg/m². Wt Readings from Last 4 Encounters:   22 103.9 kg (229 lb)   22 103.9 kg (229 lb)   22 103.9 kg (229 lb)          Intake/Output Summary (Last 24 hours) at 2022 1640  Last data filed at 2022 1439  Gross per 24 hour   Intake --   Output 685 ml   Net -685 ml         Last shift:      701 -  190  In: -   Out: 440 [Urine:400; Drains:40]  Last 3 shifts: 1901 -  07  In: -   Out: 1020 [Urine:825; Drains:195]       Hemodynamics:    CO:    CI:    CVP:    SVR:   PAP Systolic:    PAP Diastolic:    PVR:    BB97:       Ventilator Settings:      Mode Rate TV Press PEEP FiO2 PIP Min. Vent               50 %            Physical Exam:    General:  male; up walking in the room with no distress on room air saturation 98%  HEENT: NCAT, normal oral mucosa  Eyes: anicteric; conjunctiva clear extraocular movements intact  Neck: no nodes, overweight status obscures determination of JVD no accessory MM use. Chest: no deformity,   Cardiac: Regular rate and rhythm  Lungs: Clear anteriorly laterally and posteriorly  Abd: Slightly distended mildly tender bowel sounds present  Ext: no edema; no joint swelling;  No clubbing  : clear urine  Neuro: Awake alert oriented speech is clear moves all 4 extremities  Psych- no agitation, oriented to person;   Skin: warm, dry, no cyanosis;   Pulses: Brachial and radial pulses intact  Capillary: Normal capillary refill      Labs:    Recent Labs     22  0430 22  0450   WBC 7.5 10.5   HGB 9.7* 9.8*    212       Recent Labs     22  0737 22  0430 22  0450    140 139   K 3.3* 3.3* 3.6    104 104   CO2 28 28 23   GLU 93 89 70   BUN 4* 8 10   CREA 0.92 0.67* 0.72   CA 8.7 7.9* 7.5*   MG  --  2.0  --    PHOS 3.9 2.5* 1.6*   ALB 2.8* 2.3* 2.2*           Imaging:  I have personally reviewed the patients radiographs and have reviewed the reports:    CXR Results  (Last 48 hours)                 07/27/22 0241  XR CHEST PORT Final result    Impression:  Shallow volumes and bibasilar atelectasis. Narrative:  PORTABLE CHEST RADIOGRAPH/S: 7/27/2022 2:41 AM       INDICATION: Atelectasis. COMPARISON: 7/26/2022, 7/24/2022; CT abdomen pelvis 7/20/2022. TECHNIQUE: Portable frontal upright radiograph/s of the chest.       FINDINGS:    The lungs are hypoinflated, and there is atelectasis in the right middle lobe,   lingula, and bases. The central airways are patent. No pneumothorax and no large   pleural effusion. Results from Hospital Encounter encounter on 07/22/22    XR CHEST PORT    Narrative  PORTABLE CHEST RADIOGRAPH/S: 7/27/2022 2:41 AM    INDICATION: Atelectasis. COMPARISON: 7/26/2022, 7/24/2022; CT abdomen pelvis 7/20/2022. TECHNIQUE: Portable frontal upright radiograph/s of the chest.    FINDINGS:  The lungs are hypoinflated, and there is atelectasis in the right middle lobe,  lingula, and bases. The central airways are patent. No pneumothorax and no large  pleural effusion. Impression  Shallow volumes and bibasilar atelectasis. XR CHEST PORT    Narrative  EXAM:  XR CHEST PORT    INDICATION:   Atelectasis    COMPARISON: Chest radiograph 7/25/2022. FINDINGS: AP radiograph of the chest was obtained. Gastric tube coursing into the stomach out of field of view. Mild diffuse  bilateral interstitial opacities with Kerley B lines noted. Trace left pleural  effusion with left basilar atelectasis. More focal heterogeneous retrocardiac  left lower lobe opacity. No pneumothorax. Heart size is within normal limits. No  acute osseous abnormalities. Impression  1.  Pulmonary interstitial edema. Trace left pleural effusion and left basilar  atelectasis. 2. More focal left lower lobe retrocardiac opacity may represent  aspiration/infection versus atelectasis. XR CHEST PORT    Narrative  EXAM: XR CHEST PORT    INDICATION: Acute hypoxic respiratory failure    COMPARISON: 7/24/2022    FINDINGS: A portable AP radiograph of the chest was obtained at 1147 hours. The  patient is on a cardiac monitor. The NG tube extends below the hemidiaphragm. There is linear subsegmental atelectasis at the left lung base, unchanged. Linear subsegmental atelectasis at the right lung base appears to have  increased. The cardiac and mediastinal contours and pulmonary vascularity are  stable. The bones and soft tissues are grossly within normal limits. Impression  Increasing right upper lobe subsegmental atelectasis. Persistent left lower lobe subsegmental atelectasis. Results from [place] encounter on 07/20/22    CT ABD PELV WO CONT    Narrative  EXAM: CT of the abdomen and pelvis    INDICATION: Pain. COMPARISON: None. TECHNIQUE: Axial CT imaging of the abdomen and pelvis was performed without  intravenous contrast. Multiplanar reformats were generated. Dose reduction  techniques used: automated exposure control, adjustment of the mAs and/or kVp  according to patient size, and iterative reconstruction techniques. Digital  imaging and communications in Medicine (DICOM) format image data are available  to nonaffiliated external healthcare facilities or entities on a secure, media  free, reciprocally searchable basis with patient authorization for at least 12  months after this study. _______________    FINDINGS:    LOWER CHEST: Unremarkable. LIVER, BILIARY: Liver is normal. No biliary dilation. Gallstones are noted. There is gallbladder wall thickening/pericholecystic fluid.     PANCREAS: Normal.    SPLEEN: Normal.    ADRENALS: Normal.    KIDNEYS: Normal.    LYMPH NODES: No enlarged lymph nodes.    GASTROINTESTINAL TRACT: No bowel dilation or wall thickening. The appendix is  visualized and is within normal limits. PELVIC ORGANS: Unremarkable. VASCULATURE: Unremarkable. BONES: No acute or aggressive osseous abnormalities identified. OTHER: None.    _______________    Impression  Gallstones with gallbladder wall thickening/pericholecystic fluid. The  gallbladder disease. No other significant abnormality seen. Discussion history is suspicious for obstructive sleep apnea and he would benefit from outpatient testing once he is discharged. At the current time he has significant atelectasis on the chest x-ray and I have encouraged him to do self incentive spirometry hourly. Bed rest pain medications and atelectasis could be the entire cause of his hypoxia. Will supplement as needed while he is hospitalized  Discussed with the patient and answered all questions  7/26 received fentanyl at 2200 hrs. 3 AM 7 AM and developed obstructed airway signs with severe oxygen desaturation. He states Toradol did not entirely relieve his pain. Hopefully he is far enough out from surgery that the pain will start decreasing on its own. Phosphorus is low will replete tube  7/27 no episodes last night did not receive any narcotics. He is awake alert now on room air. Potassium and phosphorus to be repleted. Diet is being advanced  7/28 sleeping well at this point. He is being considered for discharge at this point I think it is appropriate. Have encouraged him to follow-up in the office for outpatient sleep testing. He is COVID-19 with no evidence of pulmonary involvement appears to be an asymptomatic infection         Thank you for allowing us to participate in the care of this patient.   We will follow along with you     Huy Barnes MD

## 2022-07-29 LAB — FERRITIN SERPL-MCNC: 587 NG/ML (ref 26–388)

## 2022-08-02 NOTE — ADT AUTH CERT NOTES
Discharge Information      Discharge Provider Date/Time Disposition Antonio Vaz MD / [phone] 07/28/22 1719 Home or [address]   Comments   (none)       Discharge Summary Notes      Discharge Summary by Gayatri Lopez MD at 07/28/22 1538  Version 1 of 1  Author: Gayatri Lopez MD Service: SURGERY Author Type: Physician   Filed: 07/28/22 7822 Date of Service: 07/28/22 1535 Status: Signed   : Gayatri Lopez MD (Physician)   Expand All Collapse All  [facility] SURGERY DISCHARGE SUMMARY        I have been requested by Dr. Danelle Osorio to manage this patient since he is leaving out of town. Chief Complaint: Abdominal pain   x  3 days     History of Present Illness:     Mr. Rafael Fuller is a 52y.o. year old * male who presented initially for right upper abdominal pain. Patient states his pain started suddenly 2 days ago but admits to a poor diet and drinking large amounts of caffeine earlier in the morning. He has had similar pain for months in his right upper back but did not seek medical attention for it. Patient was initially evaluated at [facility] ED for his symptoms and reports CT at that time demonstrated gallstones; however he felt his pain had improved so he was discharged home. Once home patient's pain returned so he presented again to [facility] ED and then was transferred to [facility]. CT abdomen/pelvis was consistent with acute cholecystitis with gallstones and gallbladder wall thickening/pericholecystic fluid. Patient was subsequently started on IVF and Zosyn. He states his pain is currently improved after pain medications. Denies nausea or vomiting. Felt feverish initially when symptoms began but did not take his temperature and has not been febrile since his admission. Past Medical History: History reviewed. No pertinent past medical history. Past Surgical History: No past surgical history on file.       Allergy: Allergies   Allergen Reactions    Opioids - Morphine Analogues Other (comments)       Patient becomes apneic with high dose narcotics (hydromorphone 2mg, fentanyl 75 mcg)          Social History:  reports that he has never smoked. He has never used smokeless tobacco. He reports that he does not drink alcohol and does not use drugs. Family History:History reviewed. No pertinent family history. Current Medications:  Current Facility-Administered Medications:    potassium chloride SR (KLOR-CON 10) tablet 10 mEq, 10 mEq, Oral, NOW, Ahsan Hayes MD    ketorolac (TORADOL) injection 15 mg, 15 mg, IntraVENous, Q6H PRN, Ahsan Hayes MD, 15 mg at 07/26/22 2134    lactated Ringers infusion, 100 mL/hr, IntraVENous, CONTINUOUS, Ahsan Hayes MD, Last Rate: 100 mL/hr at 07/28/22 0003, 100 mL/hr at 07/28/22 0003    piperacillin-tazobactam (ZOSYN) 3.375 g in 0.9% sodium chloride (MBP/ADV) 100 mL MBP, 3.375 g, IntraVENous, Q8H, Ahsan Hayes MD, Last Rate: 25 mL/hr at 07/28/22 0836, 3.375 g at 07/28/22 0836    sodium chloride (NS) flush 5-40 mL, 5-40 mL, IntraVENous, Q8H, Emil Sutherland MD, 10 mL at 07/28/22 1306    sodium chloride (NS) flush 5-40 mL, 5-40 mL, IntraVENous, PRN, Eiml Sutherland MD    acetaminophen (TYLENOL) tablet 650 mg, 650 mg, Oral, Q6H PRN, 650 mg at 07/27/22 1110 **OR** acetaminophen (TYLENOL) suppository 650 mg, 650 mg, Rectal, Q6H PRN, Emil Sutherland MD    polyethylene glycol (MIRALAX) packet 17 g, 17 g, Oral, DAILY PRN, Emil Sutherland MD, 17 g at 07/23/22 1337    ondansetron (ZOFRAN ODT) tablet 4 mg, 4 mg, Oral, Q8H PRN **OR** ondansetron (ZOFRAN) injection 4 mg, 4 mg, IntraVENous, Q6H PRN, Emil Sutherland MD, 4 mg at 07/26/22 2133    *Please  patient's home medications from pharmacy at discharge*, 1 Each, Other, PRIOR TO DISCHARGE, Emil Sutherland MD      Immunization History: There is no immunization history on file for this patient. Complete     Review of Systems:     Constitutional:  no fever,  no chills,  no sweats, No weakness, No fatigue, No decreased activity. Eye: No recent visual problem, No icterus, No discharge, No double vision. Ear/Nose/Mouth/Throat: No decreased hearing, No ear pain, No nasal congestion, No sore throat. Respiratory: No shortness of breath, No cough, No sputum production, No hemoptysis, No wheezing, No cyanosis. Cardiovascular: No chest pain, No palpitations, No bradycardia, No tachycardia, No peripheral edema, No syncope. Gastrointestinal: No nausea,  No vomiting, No diarrhea, No constipation, No heartburn,  Abdominal pain. Genitourinary: No dysuria, No hematuria, No change in urine stream, No urethral discharge, No lesions. Hematology/Lymphatics: No bruising tendency, No bleeding tendency, No petechiae, No swollen lymph glands. Endocrine: No excessive thirst, No polyuria, No cold intolerance, No heat intolerance, No excessive hunger. Immunologic: Not immunocompromised, No recurrent fevers, No recurrent infections. Musculoskeletal: No back pain, No neck pain, No joint pain, No muscle pain, No claudication, No decreased range of motion, No trauma. Integumentary: No rash, No pruritus, No abrasions. Neurologic: Alert and oriented X4, No abnormal balance, No headache, No confusion, No numbness, No tingling. Psychiatric: No anxiety, No depression, No mookie. Physical Exam:     Vitals & Measurements:          Wt Readings from Last 3 Encounters:   07/22/22 103.9 kg (229 lb)   07/21/22 103.9 kg (229 lb)   07/20/22 103.9 kg (229 lb)          Temp Readings from Last 3 Encounters:   07/28/22 97.7 °F (36.5 °C)   07/21/22 98.5 °F (36.9 °C)   07/20/22 97.6 °F (36.4 °C)          BP Readings from Last 3 Encounters:   07/28/22 (!) 146/95   07/22/22 (!) 163/93   07/21/22 (!) 152/100          Pulse Readings from Last 3 Encounters:   07/28/22 99   07/22/22 78   07/21/22 84          Ht Readings from Last 3 Encounters: 07/25/22 5' 10.98\" (1.803 m)   07/21/22 5' 11\" (1.803 m)   07/20/22 5' 11\" (1.803 m)            General: well appearing, no acute distress  Head: Normal  Face: Nornal  HEENT: atraumatic, PERRLA, moist mucosa, normal pharynx, normal tonsils and adenoids, normal tongue, no fluid in sinuses  Neck: Trachea midline, no carotid bruit, no masses  Chest: Normal.  Respiratory: Normal chest wall expansion, CTA B, no r/r/w, no rubs  Cardiovascular: RRR, no m/r/g, Normal S1 and S2  Abdomen: Soft, non tender, non-distended, normal bowel sounds in all quadrants, no hepatosplenomegaly, no tympany. Incision scar: none  Genitourinary: No inguinal hernia, normal external gentalia, Testis & scrotum normal, no renal angle tenderness  Rectal: deferred  Musculoskeletal: normal ROM in upper and lower extremities, No joint swelling.   Integumentary: Warm, dry, and pink, with no rash, purpura, or petechia  Heme/Lymph: No lymphadenopathy, no bruises  Neurological:Cranial Nerves II-XII grossly intact, no gross motor or sensory deficit  Psychiatric: Cooperative with normal mood, affect, and cognition        Laboratory Values:   Recent Results         Recent Results (from the past 24 hour(s))   RENAL FUNCTION PANEL     Collection Time: 07/28/22  7:37 AM   Result Value Ref Range     Sodium 140 136 - 145 mmol/L     Potassium 3.3 (L) 3.5 - 5.1 mmol/L     Chloride 108 97 - 108 mmol/L     CO2 28 21 - 32 mmol/L     Anion gap 4 (L) 5 - 15 mmol/L     Glucose 93 65 - 100 mg/dL     BUN 4 (L) 6 - 20 mg/dL     Creatinine 0.92 0.70 - 1.30 mg/dL     BUN/Creatinine ratio 4 (L) 12 - 20       GFR est AA >60 >60 ml/min/1.73m2     GFR est non-AA >60 >60 ml/min/1.73m2     Calcium 8.7 8.5 - 10.1 mg/dL     Phosphorus 3.9 2.6 - 4.7 mg/dL     Albumin 2.8 (L) 3.5 - 5.0 g/dL   C REACTIVE PROTEIN, QT     Collection Time: 07/28/22  7:37 AM   Result Value Ref Range     C-Reactive protein 12.50 (H) 0.00 - 0.60 mg/dL   PROCALCITONIN     Collection Time: 07/28/22  7:37 AM Result Value Ref Range     Procalcitonin 0.16 (H) 0 ng/mL   LD     Collection Time: 07/28/22  7:37 AM   Result Value Ref Range      85 - 241 U/L             XR CHEST PORT   Final Result   Shallow volumes and bibasilar atelectasis. XR CHEST PORT   Final Result   1. Pulmonary interstitial edema. Trace left pleural effusion and left basilar   atelectasis. 2. More focal left lower lobe retrocardiac opacity may represent   aspiration/infection versus atelectasis. XR CHEST PORT   Final Result       Increasing right upper lobe subsegmental atelectasis. Persistent left lower lobe subsegmental atelectasis. XR CHEST PORT   Final Result   Shallow volumes and bibasilar atelectasis. XR FLUOROSCOPY UNDER 60 MINUTES   Final Result             Assessment:  Problem List Items Addressed This Visit                  Digestive     Cholecystitis - Primary         Plan:     Admission  Diet: NPO  IV fluids  SCD  IS  Pain medications  Antibiotics: Zosyn  Nausea medication  Labs in am  OR in the AM  Procedure/Surgery: Lap cholecystectomy, possible open  12. Risk, benefits and complications including bleeding, infection, dvt, pe, mi, stroke, sepsis, injury to bowel, bile duct, bile leak,  dehiscence, open, discussed, questions answered, patient & family clearly understands and agrees with plan. All their questions were answered to their satisfaction. RN was present during entire conversation. Hospital Course:  Underwent Laparoscopy converted open cholecystectomy with drain placement. Post operatively he was having respiratory issues especially with narcotic pain medications. He was briefly transferred to ICU and Pulmonary consulted. He did well overall. COVID 19 came back positive. ID consult obtained and he did not recommend any other medications or steroids since he was doing very well on room air. He tolerated diet. D/C home today.             Thank you for the consultation & allowing me to participate in the care of this patient.

## 2023-04-20 NOTE — PROGRESS NOTES
Date of Service: 04/18/2023    HISTORY OF PRESENT ILLNESS:    The patient returns, 62 days out, doing better, still with urinary retention issues.  He is still seeing urology.  He had concerns regarding possible incontinence following recommended procedures.  He will be finishing up home therapy.  He indicated he was not interested in outpatient therapy (knee).    PHYSICAL EXAMINATION:    Exam demonstrated improved motion, a small flexion contracture and flexion to 110 degrees.  No signs of infection.      Radiographs obtained today demonstrated good position of all components, reviewed on screen with the patient.    DIAGNOSIS:    1.  9 weeks status post right total knee arthroplasty, improving.  2.  Previously noted urology issues.    PLAN:   The patient is scheduled to see his urologist over the next several days.  He will discuss their recommendations with his primary, Dr. Diaz.  He declined outpatient therapy, as above.  We will see him as needed, at his request.  All questions answered.      Dictated By: Zachery Newman III, MD  Signing Provider: Zachery Newman III, MD AJ[facility]/ele ([id number])   DD: 04/18/2023 2:22:31 PM TD: 04/19/2023 11:14:22 PM       Pt's O2 saturations dropped to the 70's after fentanyl administration. Pt on 4 L nasal cannula. O2 saturations came back up to 98%. MD notified. New orders received.

## (undated) DEVICE — DRSG AQUACEL SURG 3.5 X 10IN -- CONVERT TO ITEM 370183

## (undated) DEVICE — DERMABOND SKIN ADH 0.7ML -- DERMABOND ADVANCED 12/BX

## (undated) DEVICE — BLADE ELECTRODE: Brand: EDGE

## (undated) DEVICE — DRAPE,REIN 53X77,STERILE: Brand: MEDLINE

## (undated) DEVICE — GARMENT,MEDLINE,DVT,INT,CALF,MED, GEN2: Brand: MEDLINE

## (undated) DEVICE — SPONGE LAP SOFT 18X18 IN X RAY DETECTABLE

## (undated) DEVICE — AGENT HEMSTAT 3GM PURIFIED PLNT STARCH PWD ABSRB ARISTA AH

## (undated) DEVICE — E-Z CLEAN, PTFE COATED, ELECTROSURGICAL LAPAROSCOPIC ELECTRODE, L-HOOK, 33 CM., SINGLE-USE, FOR USE WITH HAND CONTROL PENCIL: Brand: MEGADYNE

## (undated) DEVICE — SPHINCTEROTOME: Brand: HYDRATOME RX 44

## (undated) DEVICE — TUBING INSUFFLATOR HEAT HUMIDIFIED SMK EVAC SET PNEUMOCLEAR

## (undated) DEVICE — SPONGE DRAIN NONWOVEN 4X4IN -- 2/PK

## (undated) DEVICE — CORD ES L10FT MPLR LAP

## (undated) DEVICE — TUBING SUCTION UNIV 3/16 INX20 FT W/ SCALLOPED CONN STRL

## (undated) DEVICE — LAPAROSCOPIC CHOLE PACK: Brand: MEDLINE INDUSTRIES, INC.

## (undated) DEVICE — TROCAR: Brand: KII FIOS FIRST ENTRY

## (undated) DEVICE — SUT MONOCRYL PLUS UD 4-0 --

## (undated) DEVICE — SUTURE VCRL + SZ 3-0 L36IN ABSRB UD L36MM CT-1 1/2 CIR VCP944H

## (undated) DEVICE — TISSUE RETRIEVAL SYSTEM: Brand: INZII RETRIEVAL SYSTEM

## (undated) DEVICE — GLOVE ORANGE PI 7   MSG9070

## (undated) DEVICE — LAPAROSCOPIC SCISSORS: Brand: EPIX LAPAROSCOPIC SCISSORS

## (undated) DEVICE — SUTURE SZ 0 27IN 5/8 CIR UR-6  TAPER PT VIOLET ABSRB VICRYL J603H

## (undated) DEVICE — YANKAUER,BULB TIP,W/O VENT,RIGID,STERILE: Brand: MEDLINE

## (undated) DEVICE — SOLUTION IRRIG 500ML 0.9% SOD CHL USP POUR PLAS BTL

## (undated) DEVICE — ENDO KIT 1: Brand: MEDLINE INDUSTRIES, INC.

## (undated) DEVICE — BOWL UTIL 16OZ STRL --

## (undated) DEVICE — ULNAR NERVE PROTECTOR FOAM POSITIONER: Brand: CARDINAL HEALTH

## (undated) DEVICE — TOWEL SURG W17XL27IN STD BLU COT NONFENESTRATED PREWASHED

## (undated) DEVICE — INTENDED FOR TISSUE SEPARATION, AND OTHER PROCEDURES THAT REQUIRE A SHARP SURGICAL BLADE TO PUNCTURE OR CUT.: Brand: BARD-PARKER ® CARBON RIB-BACK BLADES

## (undated) DEVICE — 2, DISPOSABLE SUCTION/IRRIGATOR WITHOUT DISPOSABLE TIP: Brand: STRYKEFLOW

## (undated) DEVICE — PREP SKN CHLRAPRP APL 26ML STR --

## (undated) DEVICE — TROCAR: Brand: KII SLEEVE

## (undated) DEVICE — BASIC SINGLE BASIN-LF: Brand: MEDLINE INDUSTRIES, INC.

## (undated) DEVICE — RETRIEVAL BALLOON CATHETER: Brand: EXTRACTOR™ PRO RX

## (undated) DEVICE — Z INACTIVE USE 2762646 COVER ENDOSCP INSTRUMENT DSTL CVR DIP 20/EA

## (undated) DEVICE — HEAD DONUT FOAM POSITIONER: Brand: CARDINAL HEALTH

## (undated) DEVICE — SOL INJ SOD CL 0.9% 1000ML BG --

## (undated) DEVICE — VISUALIZATION SYSTEM: Brand: CLEARIFY

## (undated) DEVICE — DOUBLE LUMEN WIRE GUIDED BILLROTH II SPHINCTEROTOME: Brand: COOK

## (undated) DEVICE — MOUTHPIECE ENDOSCP 20X27MM --

## (undated) DEVICE — GLOVE SURG SZ 7 L12IN FNGR THK79MIL GRN LTX FREE

## (undated) DEVICE — APPLIER CLP M/L SHFT DIA5MM 15 LIG LIGAMAX 5

## (undated) DEVICE — CANNULA NSL O2 AD 7 FT END-TIDAL CARBON DIOX VENTFLO

## (undated) DEVICE — SOUTHSIDE TURNOVER: Brand: MEDLINE INDUSTRIES, INC.